# Patient Record
Sex: FEMALE | Race: WHITE
[De-identification: names, ages, dates, MRNs, and addresses within clinical notes are randomized per-mention and may not be internally consistent; named-entity substitution may affect disease eponyms.]

---

## 2024-10-22 SDOH — HEALTH STABILITY: PHYSICAL HEALTH
ON AVERAGE, HOW MANY DAYS PER WEEK DO YOU ENGAGE IN MODERATE TO STRENUOUS EXERCISE (LIKE A BRISK WALK)?: PATIENT DECLINED

## 2024-10-23 ENCOUNTER — OFFICE VISIT (OUTPATIENT)
Facility: CLINIC | Age: 49
End: 2024-10-23
Payer: COMMERCIAL

## 2024-10-23 VITALS
DIASTOLIC BLOOD PRESSURE: 65 MMHG | BODY MASS INDEX: 29.88 KG/M2 | TEMPERATURE: 97.9 F | WEIGHT: 190.4 LBS | RESPIRATION RATE: 16 BRPM | HEIGHT: 67 IN | OXYGEN SATURATION: 95 % | SYSTOLIC BLOOD PRESSURE: 107 MMHG | HEART RATE: 79 BPM

## 2024-10-23 DIAGNOSIS — Z12.31 ENCOUNTER FOR SCREENING MAMMOGRAM FOR BREAST CANCER: ICD-10-CM

## 2024-10-23 DIAGNOSIS — G25.81 RLS (RESTLESS LEGS SYNDROME): ICD-10-CM

## 2024-10-23 DIAGNOSIS — Z76.89 ENCOUNTER TO ESTABLISH CARE: ICD-10-CM

## 2024-10-23 DIAGNOSIS — F41.9 ANXIETY: ICD-10-CM

## 2024-10-23 DIAGNOSIS — E03.9 HYPOTHYROIDISM, UNSPECIFIED TYPE: ICD-10-CM

## 2024-10-23 DIAGNOSIS — Z12.11 SCREENING FOR COLON CANCER: ICD-10-CM

## 2024-10-23 DIAGNOSIS — I10 PRIMARY HYPERTENSION: Primary | ICD-10-CM

## 2024-10-23 DIAGNOSIS — J45.909 MILD ASTHMA WITHOUT COMPLICATION, UNSPECIFIED WHETHER PERSISTENT: ICD-10-CM

## 2024-10-23 DIAGNOSIS — L40.50 PA (PSORIATIC ARTHRITIS) (HCC): ICD-10-CM

## 2024-10-23 PROBLEM — Z86.711 HISTORY OF PULMONARY EMBOLUS (PE): Status: ACTIVE | Noted: 2024-10-23

## 2024-10-23 PROBLEM — M79.7 FIBROMYALGIA: Status: ACTIVE | Noted: 2022-12-01

## 2024-10-23 PROBLEM — F33.40 RECURRENT MAJOR DEPRESSIVE DISORDER, IN REMISSION (HCC): Status: ACTIVE | Noted: 2024-10-23

## 2024-10-23 PROBLEM — Z86.718 HISTORY OF DEEP VENOUS THROMBOSIS: Status: ACTIVE | Noted: 2024-10-23

## 2024-10-23 LAB
A/G RATIO: 1.4 RATIO (ref 1.1–2.6)
ALBUMIN: 4 G/DL (ref 3.5–5)
ALP BLD-CCNC: 40 U/L (ref 25–115)
ALT SERPL-CCNC: 9 U/L (ref 5–40)
ANION GAP SERPL CALCULATED.3IONS-SCNC: 7 MMOL/L (ref 3–15)
AST SERPL-CCNC: 14 U/L (ref 10–37)
BASOPHILS ABSOLUTE: 0.1 K/UL (ref 0–0.2)
BASOPHILS RELATIVE PERCENT: 1 % (ref 0–2)
BILIRUB SERPL-MCNC: 0.3 MG/DL (ref 0.2–1.2)
BUN BLDV-MCNC: 11 MG/DL (ref 6–22)
CALCIUM SERPL-MCNC: 9.7 MG/DL (ref 8.4–10.5)
CHLORIDE BLD-SCNC: 102 MMOL/L (ref 98–110)
CHOLESTEROL, TOTAL: 158 MG/DL (ref 110–200)
CHOLESTEROL/HDL RATIO: 2.4 (ref 0–5)
CO2: 31 MMOL/L (ref 20–32)
CREAT SERPL-MCNC: 0.8 MG/DL (ref 0.5–1.2)
EOSINOPHIL # BLD: 6 % (ref 0–6)
EOSINOPHILS ABSOLUTE: 0.5 K/UL (ref 0–0.5)
GFR, ESTIMATED: >60 ML/MIN/1.73 SQ.M.
GLOBULIN: 2.9 G/DL (ref 2–4)
GLUCOSE: 84 MG/DL (ref 70–99)
HCT VFR BLD CALC: 39.3 % (ref 35.1–48)
HDLC SERPL-MCNC: 67 MG/DL
HEMOGLOBIN: 12.8 G/DL (ref 11.7–16)
LDL CHOLESTEROL: 78 MG/DL (ref 50–99)
LDL/HDL RATIO: 1.2
LYMPHOCYTES # BLD: 38 % (ref 20–45)
LYMPHOCYTES ABSOLUTE: 3.4 K/UL (ref 1–4.8)
MCH RBC QN AUTO: 31 PG (ref 26–34)
MCHC RBC AUTO-ENTMCNC: 33 G/DL (ref 31–36)
MCV RBC AUTO: 95 FL (ref 80–99)
MONOCYTES ABSOLUTE: 1.3 K/UL (ref 0.1–1)
MONOCYTES: 15 % (ref 3–12)
NEUTROPHILS ABSOLUTE: 3.7 K/UL (ref 1.8–7.7)
NEUTROPHILS: 41 % (ref 40–75)
NON-HDL CHOLESTEROL: 91 MG/DL
PDW BLD-RTO: 12.3 % (ref 10–15.5)
PLATELET # BLD: 195 K/UL (ref 140–440)
PMV BLD AUTO: 13.3 FL (ref 9–13)
POTASSIUM SERPL-SCNC: 4.4 MMOL/L (ref 3.5–5.5)
RBC # BLD: 4.12 M/UL (ref 3.8–5.2)
SODIUM BLD-SCNC: 140 MMOL/L (ref 133–145)
TOTAL PROTEIN: 6.9 G/DL (ref 6.4–8.3)
TRIGL SERPL-MCNC: 62 MG/DL (ref 40–149)
TSH SERPL DL<=0.05 MIU/L-ACNC: 0.03 MCU/ML (ref 0.27–4.2)
VLDLC SERPL CALC-MCNC: 12 MG/DL (ref 8–30)
WBC # BLD: 9 K/UL (ref 4–11)

## 2024-10-23 PROCEDURE — 3078F DIAST BP <80 MM HG: CPT

## 2024-10-23 PROCEDURE — 99205 OFFICE O/P NEW HI 60 MIN: CPT

## 2024-10-23 PROCEDURE — 3074F SYST BP LT 130 MM HG: CPT

## 2024-10-23 RX ORDER — TRAMADOL HYDROCHLORIDE 50 MG/1
50 TABLET ORAL EVERY 8 HOURS PRN
COMMUNITY
Start: 2024-09-05

## 2024-10-23 RX ORDER — LEVOTHYROXINE SODIUM 137 UG/1
137 TABLET ORAL DAILY
COMMUNITY
Start: 2024-09-21 | End: 2024-10-23 | Stop reason: SDUPTHER

## 2024-10-23 RX ORDER — USTEKINUMAB 90 MG/ML
1 INJECTION, SOLUTION SUBCUTANEOUS
COMMUNITY
Start: 2023-10-07

## 2024-10-23 RX ORDER — MELOXICAM 15 MG/1
1 TABLET ORAL DAILY
COMMUNITY
Start: 2021-12-21

## 2024-10-23 RX ORDER — LISINOPRIL 5 MG/1
1 TABLET ORAL DAILY
COMMUNITY
Start: 2024-09-05

## 2024-10-23 RX ORDER — PREGABALIN 200 MG/1
1 CAPSULE ORAL EVERY 8 HOURS
COMMUNITY
Start: 2020-08-01

## 2024-10-23 RX ORDER — ALBUTEROL SULFATE 90 UG/1
2 INHALANT RESPIRATORY (INHALATION) 4 TIMES DAILY PRN
Qty: 18 G | Refills: 5 | Status: SHIPPED | OUTPATIENT
Start: 2024-10-23

## 2024-10-23 RX ORDER — LEVOTHYROXINE SODIUM 137 UG/1
137 TABLET ORAL DAILY
Qty: 30 TABLET | Refills: 0 | Status: SHIPPED | OUTPATIENT
Start: 2024-10-23

## 2024-10-23 RX ORDER — MONTELUKAST SODIUM 10 MG/1
10 TABLET ORAL NIGHTLY
COMMUNITY
Start: 2004-09-01

## 2024-10-23 SDOH — ECONOMIC STABILITY: FOOD INSECURITY: WITHIN THE PAST 12 MONTHS, THE FOOD YOU BOUGHT JUST DIDN'T LAST AND YOU DIDN'T HAVE MONEY TO GET MORE.: NEVER TRUE

## 2024-10-23 SDOH — ECONOMIC STABILITY: FOOD INSECURITY: WITHIN THE PAST 12 MONTHS, YOU WORRIED THAT YOUR FOOD WOULD RUN OUT BEFORE YOU GOT MONEY TO BUY MORE.: NEVER TRUE

## 2024-10-23 SDOH — ECONOMIC STABILITY: INCOME INSECURITY: HOW HARD IS IT FOR YOU TO PAY FOR THE VERY BASICS LIKE FOOD, HOUSING, MEDICAL CARE, AND HEATING?: NOT HARD AT ALL

## 2024-10-23 ASSESSMENT — PATIENT HEALTH QUESTIONNAIRE - PHQ9
SUM OF ALL RESPONSES TO PHQ QUESTIONS 1-9: 0
SUM OF ALL RESPONSES TO PHQ QUESTIONS 1-9: 0
SUM OF ALL RESPONSES TO PHQ9 QUESTIONS 1 & 2: 0
2. FEELING DOWN, DEPRESSED OR HOPELESS: NOT AT ALL
SUM OF ALL RESPONSES TO PHQ QUESTIONS 1-9: 0
1. LITTLE INTEREST OR PLEASURE IN DOING THINGS: NOT AT ALL
SUM OF ALL RESPONSES TO PHQ QUESTIONS 1-9: 0

## 2024-10-23 ASSESSMENT — ANXIETY QUESTIONNAIRES
GAD7 TOTAL SCORE: 12
7. FEELING AFRAID AS IF SOMETHING AWFUL MIGHT HAPPEN: NOT AT ALL
5. BEING SO RESTLESS THAT IT IS HARD TO SIT STILL: MORE THAN HALF THE DAYS
4. TROUBLE RELAXING: NEARLY EVERY DAY
2. NOT BEING ABLE TO STOP OR CONTROL WORRYING: MORE THAN HALF THE DAYS
1. FEELING NERVOUS, ANXIOUS, OR ON EDGE: MORE THAN HALF THE DAYS
6. BECOMING EASILY ANNOYED OR IRRITABLE: SEVERAL DAYS
3. WORRYING TOO MUCH ABOUT DIFFERENT THINGS: MORE THAN HALF THE DAYS
IF YOU CHECKED OFF ANY PROBLEMS ON THIS QUESTIONNAIRE, HOW DIFFICULT HAVE THESE PROBLEMS MADE IT FOR YOU TO DO YOUR WORK, TAKE CARE OF THINGS AT HOME, OR GET ALONG WITH OTHER PEOPLE: VERY DIFFICULT

## 2024-10-23 ASSESSMENT — ENCOUNTER SYMPTOMS: SHORTNESS OF BREATH: 0

## 2024-10-23 NOTE — PROGRESS NOTES
Nicole Ennis presents today for   Chief Complaint   Patient presents with    New Patient     Current/Previous PCP/Specialists: [place]  Previous PCP: [place]  Reason for switching: Moved  Any speciality providers: Rhm, Neuro, Ortho    Is someone accompanying this pt? No    Is the patient using any DME equipment during OV? No    Depression Screening:      10/23/2024     8:18 AM   PHQ-9 Questionaire   Little interest or pleasure in doing things 0   Feeling down, depressed, or hopeless 0   PHQ-9 Total Score 0        MARCO ANTONIO 7-Anxiety       10/23/2024     8:18 AM   MARCO ANTONIO-7 SCREENING   Feeling nervous, anxious, or on edge More than half the days   Not being able to stop or control worrying More than half the days   Worrying too much about different things More than half the days   Trouble relaxing Nearly every day   Being so restless that it is hard to sit still More than half the days   Becoming easily annoyed or irritable Several days   Feeling afraid as if something awful might happen Not at all   MARCO ANTONIO-7 Total Score 12   How difficult have these problems made it for you to do your work, take care of things at home, or get along with other people? Very difficult        Travel Screening:    Travel Screening       Question Response    Have you been in contact with someone who was sick? No / Unsure    Do you have any of the following new or worsening symptoms? None of these    Have you traveled internationally or domestically in the last month? No          Travel History   Travel since 09/23/24    No documented travel since 09/23/24          Health Maintenance reviewed and discussed and ordered per Provider.  Transportation Needs: Unknown (10/23/2024)    PRAPARE - Transportation     Lack of Transportation (Medical): Not on file     Lack of Transportation (Non-Medical): No      Food Insecurity: No Food Insecurity (10/23/2024)    Hunger Vital Sign     Worried About Running Out of Food in the Last Year: Never true     Ran Out 
Roommate   Alcohol use: None   Tobacco use: Vapes nicotine   Other recreational drug use: marijuana     Gyn Hx:   2 Para 2  Menopause: Venlafaxine 50 mg helps with hot flashes   If menopausal, any abnormal vaginal bleeding?    Hysterectomy: Partial   Would like to have hormones checked   Last mammogram (if applicable): Due; reports history fibrocystic tissue on prior mammograms (left breast)     Review of Systems   Respiratory:  Negative for shortness of breath.    Cardiovascular:  Negative for chest pain, palpitations and leg swelling.   Neurological:  Negative for dizziness, light-headedness and headaches.      Objective:   /65 (Site: Left Upper Arm, Position: Sitting, Cuff Size: Large Adult)   Pulse 79   Temp 97.9 °F (36.6 °C) (Temporal)   Resp 16   Ht 1.702 m (5' 7\")   Wt 86.4 kg (190 lb 6.4 oz)   SpO2 95%   BMI 29.82 kg/m²     Physical Exam  Vitals and nursing note reviewed.   Constitutional:       General: She is not in acute distress.     Appearance: She is not ill-appearing.   HENT:      Head: Normocephalic and atraumatic.   Cardiovascular:      Rate and Rhythm: Normal rate and regular rhythm.   Pulmonary:      Effort: Pulmonary effort is normal. No respiratory distress.      Breath sounds: Wheezing present. No rhonchi or rales.   Musculoskeletal:         General: Normal range of motion.   Skin:     General: Skin is warm and dry.   Neurological:      General: No focal deficit present.      Mental Status: She is alert.   Psychiatric:         Mood and Affect: Mood normal.         Thought Content: Thought content normal.         Judgment: Judgment normal.     Total time spent: 60 min   Bakari Alejandra, APRN - CNP

## 2024-10-23 NOTE — ASSESSMENT & PLAN NOTE
Controlled  Continue current medications  Labs ordered today  Awaiting results  Maintain low-salt diet  Daily exercise as tolerated

## 2024-11-06 ENCOUNTER — OFFICE VISIT (OUTPATIENT)
Facility: CLINIC | Age: 49
End: 2024-11-06
Payer: COMMERCIAL

## 2024-11-06 VITALS
WEIGHT: 188 LBS | BODY MASS INDEX: 29.51 KG/M2 | OXYGEN SATURATION: 96 % | RESPIRATION RATE: 18 BRPM | DIASTOLIC BLOOD PRESSURE: 68 MMHG | HEIGHT: 67 IN | TEMPERATURE: 98 F | SYSTOLIC BLOOD PRESSURE: 102 MMHG | HEART RATE: 81 BPM

## 2024-11-06 DIAGNOSIS — J45.909 MILD ASTHMA WITHOUT COMPLICATION, UNSPECIFIED WHETHER PERSISTENT: ICD-10-CM

## 2024-11-06 DIAGNOSIS — Z71.2 ENCOUNTER TO DISCUSS TEST RESULTS: ICD-10-CM

## 2024-11-06 DIAGNOSIS — G25.81 RLS (RESTLESS LEGS SYNDROME): ICD-10-CM

## 2024-11-06 DIAGNOSIS — E03.9 HYPOTHYROIDISM, UNSPECIFIED TYPE: Primary | ICD-10-CM

## 2024-11-06 DIAGNOSIS — I10 PRIMARY HYPERTENSION: ICD-10-CM

## 2024-11-06 PROCEDURE — 3078F DIAST BP <80 MM HG: CPT

## 2024-11-06 PROCEDURE — 99214 OFFICE O/P EST MOD 30 MIN: CPT

## 2024-11-06 PROCEDURE — 3074F SYST BP LT 130 MM HG: CPT

## 2024-11-06 RX ORDER — LISINOPRIL 5 MG/1
5 TABLET ORAL DAILY
Qty: 30 TABLET | Refills: 2 | Status: SHIPPED | OUTPATIENT
Start: 2024-11-06

## 2024-11-06 RX ORDER — MONTELUKAST SODIUM 10 MG/1
10 TABLET ORAL NIGHTLY
Qty: 30 TABLET | Refills: 2 | Status: SHIPPED | OUTPATIENT
Start: 2024-11-06

## 2024-11-06 RX ORDER — PREGABALIN 200 MG/1
200 CAPSULE ORAL 2 TIMES DAILY
Qty: 60 CAPSULE | Refills: 0 | Status: SHIPPED | OUTPATIENT
Start: 2024-11-06 | End: 2024-11-07

## 2024-11-06 SDOH — ECONOMIC STABILITY: FOOD INSECURITY: WITHIN THE PAST 12 MONTHS, YOU WORRIED THAT YOUR FOOD WOULD RUN OUT BEFORE YOU GOT MONEY TO BUY MORE.: NEVER TRUE

## 2024-11-06 SDOH — ECONOMIC STABILITY: FOOD INSECURITY: WITHIN THE PAST 12 MONTHS, THE FOOD YOU BOUGHT JUST DIDN'T LAST AND YOU DIDN'T HAVE MONEY TO GET MORE.: NEVER TRUE

## 2024-11-06 SDOH — ECONOMIC STABILITY: INCOME INSECURITY: HOW HARD IS IT FOR YOU TO PAY FOR THE VERY BASICS LIKE FOOD, HOUSING, MEDICAL CARE, AND HEATING?: NOT HARD AT ALL

## 2024-11-06 ASSESSMENT — PATIENT HEALTH QUESTIONNAIRE - PHQ9
10. IF YOU CHECKED OFF ANY PROBLEMS, HOW DIFFICULT HAVE THESE PROBLEMS MADE IT FOR YOU TO DO YOUR WORK, TAKE CARE OF THINGS AT HOME, OR GET ALONG WITH OTHER PEOPLE: NOT DIFFICULT AT ALL
8. MOVING OR SPEAKING SO SLOWLY THAT OTHER PEOPLE COULD HAVE NOTICED. OR THE OPPOSITE, BEING SO FIGETY OR RESTLESS THAT YOU HAVE BEEN MOVING AROUND A LOT MORE THAN USUAL: NOT AT ALL
SUM OF ALL RESPONSES TO PHQ QUESTIONS 1-9: 0
5. POOR APPETITE OR OVEREATING: NOT AT ALL
SUM OF ALL RESPONSES TO PHQ QUESTIONS 1-9: 0
2. FEELING DOWN, DEPRESSED OR HOPELESS: NOT AT ALL
SUM OF ALL RESPONSES TO PHQ QUESTIONS 1-9: 0
SUM OF ALL RESPONSES TO PHQ9 QUESTIONS 1 & 2: 0
9. THOUGHTS THAT YOU WOULD BE BETTER OFF DEAD, OR OF HURTING YOURSELF: NOT AT ALL
7. TROUBLE CONCENTRATING ON THINGS, SUCH AS READING THE NEWSPAPER OR WATCHING TELEVISION: NOT AT ALL
4. FEELING TIRED OR HAVING LITTLE ENERGY: NOT AT ALL
SUM OF ALL RESPONSES TO PHQ QUESTIONS 1-9: 0
3. TROUBLE FALLING OR STAYING ASLEEP: NOT AT ALL
1. LITTLE INTEREST OR PLEASURE IN DOING THINGS: NOT AT ALL
6. FEELING BAD ABOUT YOURSELF - OR THAT YOU ARE A FAILURE OR HAVE LET YOURSELF OR YOUR FAMILY DOWN: NOT AT ALL

## 2024-11-06 ASSESSMENT — ANXIETY QUESTIONNAIRES
6. BECOMING EASILY ANNOYED OR IRRITABLE: SEVERAL DAYS
IF YOU CHECKED OFF ANY PROBLEMS ON THIS QUESTIONNAIRE, HOW DIFFICULT HAVE THESE PROBLEMS MADE IT FOR YOU TO DO YOUR WORK, TAKE CARE OF THINGS AT HOME, OR GET ALONG WITH OTHER PEOPLE: SOMEWHAT DIFFICULT
4. TROUBLE RELAXING: MORE THAN HALF THE DAYS
7. FEELING AFRAID AS IF SOMETHING AWFUL MIGHT HAPPEN: NOT AT ALL
3. WORRYING TOO MUCH ABOUT DIFFERENT THINGS: SEVERAL DAYS
2. NOT BEING ABLE TO STOP OR CONTROL WORRYING: MORE THAN HALF THE DAYS
5. BEING SO RESTLESS THAT IT IS HARD TO SIT STILL: SEVERAL DAYS
GAD7 TOTAL SCORE: 9
1. FEELING NERVOUS, ANXIOUS, OR ON EDGE: MORE THAN HALF THE DAYS

## 2024-11-06 NOTE — PROGRESS NOTES
Chief Complaint   Patient presents with    Follow-up    Discuss Labs     Assessment & Plan:     1. Hypothyroidism, unspecified type  Assessment & Plan:  Repeat labs ordered today   Continue levothyroxine 112 mcg daily  Awaiting results  Orders:  -     Basic Metabolic Panel; Future  -     CBC with Auto Differential; Future  -     Thyroid Cascade Profile; Future  2. RLS (restless legs syndrome)  Assessment & Plan:  Stable on medication  Continue Lyrica 200 mg BID   Medication refilled per patient request   CSA reviewed and signed today   Advised to establish with neurology   Referral information given today   Orders:  -     LYRICA 200 MG capsule; Take 1 capsule by mouth 2 times daily for 30 days. Max Daily Amount: 400 mg, Disp-60 capsule, R-0, DAWNormal  3. Mild asthma without complication, unspecified whether persistent  Assessment & Plan:  Stable on medication   Continue montelukast 10 mg daily   Medication refilled per patient request  Orders:  -     montelukast (SINGULAIR) 10 MG tablet; Take 1 tablet by mouth nightly, Disp-30 tablet, R-2Normal  4. Primary hypertension  Assessment & Plan:  Controlled on medication   Continue lisinopril 5 mg daily  Medication refilled per patient request  Orders:  -     lisinopril (PRINIVIL;ZESTRIL) 5 MG tablet; Take 1 tablet by mouth daily, Disp-30 tablet, R-2Normal  5. Encounter to discuss test results  Comments:  Labs reviewed in depth with patient  Verbalized understanding and agreeable to plan   No further questions/concerns at this time    Follow-up and Dispositions    Return in about 8 weeks (around 1/1/2025) for chronic disease management (tsh).       Subjective:     HPI  Chronic Disease Management  Hypothyroidism:   Denies any weight changes, palpitations, constipation/diarrhea, skin/hair changes  Taking medication on empty stomach  Current treatment: 112 mcg   Was recently decreased to this dose due to low TSH   States that she does have a history of thyroid nodule which 
other people? Somewhat difficult Very difficult        Travel Screening:    Travel Screening     No screening recorded since 11/05/24 0000       Travel History   Travel since 10/06/24    No documented travel since 10/06/24          Health Maintenance reviewed and discussed and ordered per Provider.  Transportation Needs: Unknown (11/6/2024)    PRAPARE - Transportation     Lack of Transportation (Medical): Not on file     Lack of Transportation (Non-Medical): No      Food Insecurity: No Food Insecurity (11/6/2024)    Hunger Vital Sign     Worried About Running Out of Food in the Last Year: Never true     Ran Out of Food in the Last Year: Never true     Financial Resource Strain: Low Risk  (11/6/2024)    Overall Financial Resource Strain (CARDIA)     Difficulty of Paying Living Expenses: Not hard at all     Housing Stability: Unknown (11/6/2024)    Housing Stability Vital Sign     Unable to Pay for Housing in the Last Year: Not on file     Number of Times Moved in the Last Year: Not on file     Homeless in the Last Year: No       Did you provide resources if patient requested them? None Requested      Health Maintenance Due   Topic Date Due    Pneumococcal 0-64 years Vaccine (1 of 2 - PCV) Never done    HIV screen  Never done    Hepatitis C screen  Never done    Hepatitis B vaccine (1 of 3 - 19+ 3-dose series) Never done    DTaP/Tdap/Td vaccine (1 - Tdap) Never done    Breast cancer screen  Never done    Colorectal Cancer Screen  Never done    Shingles vaccine (1 of 2) Never done    Flu vaccine (1) Never done    COVID-19 Vaccine (1 - 2023-24 season) Never done   .      \"Have you been to the ER, urgent care clinic since your last visit?  Hospitalized since your last visit?\"    NO    “Have you seen or consulted any other health care providers outside of [facility] since your last visit?”    NO    “Have you had a colorectal cancer screening such as a colonoscopy/FIT/Cologuard?    NO    No colonoscopy on

## 2024-11-07 ENCOUNTER — TELEPHONE (OUTPATIENT)
Facility: CLINIC | Age: 49
End: 2024-11-07

## 2024-11-07 RX ORDER — PREGABALIN 100 MG/1
100 CAPSULE ORAL 2 TIMES DAILY
Qty: 60 CAPSULE | Refills: 0 | Status: SHIPPED | OUTPATIENT
Start: 2024-11-07 | End: 2024-12-07

## 2024-11-07 ASSESSMENT — ENCOUNTER SYMPTOMS: SHORTNESS OF BREATH: 0

## 2024-11-07 NOTE — ASSESSMENT & PLAN NOTE
Controlled on medication   Continue lisinopril 5 mg daily  Medication refilled per patient request

## 2024-11-07 NOTE — TELEPHONE ENCOUNTER
Pt called and stated her insurance denied her medication LYRICA 200 MG capsule. Pt stated she really needs her medication or she is going to be in the hospital in 24 hours. Please advise. Thank you!!!

## 2024-11-07 NOTE — ASSESSMENT & PLAN NOTE
Stable on medication  Continue Lyrica 200 mg BID   Medication refilled per patient request   CSA reviewed and signed today   Advised to establish with neurology   Referral information given today    Doxepin Counseling:  Patient advised that the medication is sedating and not to drive a car after taking this medication. Patient informed of potential adverse effects including but not limited to dry mouth, urinary retention, and blurry vision.  The patient verbalized understanding of the proper use and possible adverse effects of doxepin.  All of the patient's questions and concerns were addressed.

## 2024-11-07 NOTE — TELEPHONE ENCOUNTER
Called patient to let her know Bakari has prescribed her the generic brand Preagablin as instructed by her insurance. Patient understood and says that is completley fine.

## 2024-11-22 DIAGNOSIS — G25.81 RLS (RESTLESS LEGS SYNDROME): ICD-10-CM

## 2024-11-22 RX ORDER — PREGABALIN 100 MG/1
100 CAPSULE ORAL 2 TIMES DAILY
Qty: 60 CAPSULE | Refills: 0 | Status: SHIPPED | OUTPATIENT
Start: 2024-11-22 | End: 2024-12-22

## 2024-11-25 RX ORDER — PREGABALIN 100 MG/1
100 CAPSULE ORAL 2 TIMES DAILY
Qty: 60 CAPSULE | Refills: 0 | OUTPATIENT
Start: 2024-11-25 | End: 2024-12-25

## 2024-11-27 DIAGNOSIS — G25.81 RLS (RESTLESS LEGS SYNDROME): Primary | ICD-10-CM

## 2024-11-27 RX ORDER — PREGABALIN 200 MG/1
200 CAPSULE ORAL 2 TIMES DAILY
Qty: 30 CAPSULE | Refills: 0 | Status: SHIPPED | OUTPATIENT
Start: 2024-11-27 | End: 2024-12-27

## 2024-12-02 ENCOUNTER — OFFICE VISIT (OUTPATIENT)
Age: 49
End: 2024-12-02
Payer: COMMERCIAL

## 2024-12-02 VITALS
WEIGHT: 177.8 LBS | HEIGHT: 67 IN | HEART RATE: 96 BPM | RESPIRATION RATE: 12 BRPM | OXYGEN SATURATION: 97 % | TEMPERATURE: 98.5 F | DIASTOLIC BLOOD PRESSURE: 67 MMHG | BODY MASS INDEX: 27.91 KG/M2 | SYSTOLIC BLOOD PRESSURE: 95 MMHG

## 2024-12-02 DIAGNOSIS — G25.81 RLS (RESTLESS LEGS SYNDROME): Primary | ICD-10-CM

## 2024-12-02 PROCEDURE — 99204 OFFICE O/P NEW MOD 45 MIN: CPT | Performed by: PSYCHIATRY & NEUROLOGY

## 2024-12-02 PROCEDURE — 3074F SYST BP LT 130 MM HG: CPT | Performed by: PSYCHIATRY & NEUROLOGY

## 2024-12-02 PROCEDURE — G2211 COMPLEX E/M VISIT ADD ON: HCPCS | Performed by: PSYCHIATRY & NEUROLOGY

## 2024-12-02 PROCEDURE — 3078F DIAST BP <80 MM HG: CPT | Performed by: PSYCHIATRY & NEUROLOGY

## 2024-12-02 RX ORDER — PRAMIPEXOLE DIHYDROCHLORIDE 0.5 MG/1
0.5 TABLET ORAL NIGHTLY
Qty: 90 TABLET | Refills: 3 | Status: SHIPPED | OUTPATIENT
Start: 2024-12-02

## 2024-12-02 RX ORDER — PREGABALIN 200 MG/1
200 CAPSULE ORAL 2 TIMES DAILY
Qty: 60 CAPSULE | Refills: 5 | Status: SHIPPED | OUTPATIENT
Start: 2024-12-02 | End: 2025-05-31

## 2024-12-02 NOTE — PROGRESS NOTES
CO2,  Chloride, Creatinine, Glucose, Potassium, Sodium, Total Bilirubin and Total  Protein.        Cholesterol, Total 10/23/2024 158  110 - 200 mg/dL Final    Triglycerides 10/23/2024 62  40 - 149 mg/dL Final    HDL 10/23/2024 67  >=40 mg/dL Final    Chol/HDL Ratio 10/23/2024 2.4  0.0 - 5.0 Final    Non-HDL Cholesterol 10/23/2024 91  <130 mg/dL Final    LDL Cholesterol 10/23/2024 78  50 - 99 mg/dL Final    VLDL Cholesterol Calculated 10/23/2024 12  8 - 30 mg/dL Final    LDL/HDL Ratio 10/23/2024 1.2   Final    Comment: Test includes cholesterol, HDL cholesterol, triglycerides and LDL.    Cholesterol Recommended NCEP guidelines in mg/dL:    Less than 200            Desirable  200 - 239                Borderline High  Greater than or  = 240   High      Please Note:  Total Chol/HDL Ratio                     Men     Women  1/2 Avg. Risk    3.4     3.3      Avg. Risk    5.0     4.4  2X  Avg. Risk    9.6     7.1  3X  Avg. Risk   23.4    11.0                Medical problems and test results were reviewed with the patient today.         ASSESSMENT and PLAN    1. RLS (restless legs syndrome)  Treatment refractory restless leg symptoms.  Gabapentin, ropinirole has been effective in the past.  Will start Mirapex.  Counseled on side effect profile of sedation, lower extremity swelling and compulsive behaviors.  Iron studies should be checked and if any iron deficiency present, it should be treated. She states this has been checked by her previous neurologist.  Continue pregabalin.   No follow-up provider specified.  Lencho Pacheco MD

## 2024-12-19 ENCOUNTER — HOSPITAL ENCOUNTER (OUTPATIENT)
Facility: HOSPITAL | Age: 49
Discharge: HOME | End: 2024-12-22
Payer: COMMERCIAL

## 2024-12-19 VITALS — BODY MASS INDEX: 27.78 KG/M2 | WEIGHT: 177 LBS | HEIGHT: 67 IN

## 2024-12-19 DIAGNOSIS — Z12.31 ENCOUNTER FOR SCREENING MAMMOGRAM FOR BREAST CANCER: ICD-10-CM

## 2024-12-19 PROCEDURE — 77063 BREAST TOMOSYNTHESIS BI: CPT

## 2025-01-07 ENCOUNTER — OFFICE VISIT (OUTPATIENT)
Facility: CLINIC | Age: 50
End: 2025-01-07
Payer: COMMERCIAL

## 2025-01-07 VITALS
HEART RATE: 82 BPM | WEIGHT: 182 LBS | DIASTOLIC BLOOD PRESSURE: 79 MMHG | RESPIRATION RATE: 18 BRPM | TEMPERATURE: 97.8 F | OXYGEN SATURATION: 98 % | BODY MASS INDEX: 28.56 KG/M2 | SYSTOLIC BLOOD PRESSURE: 129 MMHG | HEIGHT: 67 IN

## 2025-01-07 DIAGNOSIS — G25.81 RLS (RESTLESS LEGS SYNDROME): ICD-10-CM

## 2025-01-07 DIAGNOSIS — N39.3 STRESS INCONTINENCE: Primary | ICD-10-CM

## 2025-01-07 PROCEDURE — 3078F DIAST BP <80 MM HG: CPT

## 2025-01-07 PROCEDURE — 3074F SYST BP LT 130 MM HG: CPT

## 2025-01-07 PROCEDURE — 99214 OFFICE O/P EST MOD 30 MIN: CPT

## 2025-01-07 SDOH — ECONOMIC STABILITY: FOOD INSECURITY: WITHIN THE PAST 12 MONTHS, YOU WORRIED THAT YOUR FOOD WOULD RUN OUT BEFORE YOU GOT MONEY TO BUY MORE.: NEVER TRUE

## 2025-01-07 SDOH — ECONOMIC STABILITY: INCOME INSECURITY: HOW HARD IS IT FOR YOU TO PAY FOR THE VERY BASICS LIKE FOOD, HOUSING, MEDICAL CARE, AND HEATING?: NOT HARD AT ALL

## 2025-01-07 SDOH — ECONOMIC STABILITY: FOOD INSECURITY: WITHIN THE PAST 12 MONTHS, THE FOOD YOU BOUGHT JUST DIDN'T LAST AND YOU DIDN'T HAVE MONEY TO GET MORE.: NEVER TRUE

## 2025-01-07 ASSESSMENT — PATIENT HEALTH QUESTIONNAIRE - PHQ9
10. IF YOU CHECKED OFF ANY PROBLEMS, HOW DIFFICULT HAVE THESE PROBLEMS MADE IT FOR YOU TO DO YOUR WORK, TAKE CARE OF THINGS AT HOME, OR GET ALONG WITH OTHER PEOPLE: NOT DIFFICULT AT ALL
6. FEELING BAD ABOUT YOURSELF - OR THAT YOU ARE A FAILURE OR HAVE LET YOURSELF OR YOUR FAMILY DOWN: NOT AT ALL
9. THOUGHTS THAT YOU WOULD BE BETTER OFF DEAD, OR OF HURTING YOURSELF: NOT AT ALL
SUM OF ALL RESPONSES TO PHQ QUESTIONS 1-9: 0
SUM OF ALL RESPONSES TO PHQ QUESTIONS 1-9: 0
2. FEELING DOWN, DEPRESSED OR HOPELESS: NOT AT ALL
1. LITTLE INTEREST OR PLEASURE IN DOING THINGS: NOT AT ALL
5. POOR APPETITE OR OVEREATING: NOT AT ALL
SUM OF ALL RESPONSES TO PHQ9 QUESTIONS 1 & 2: 0
8. MOVING OR SPEAKING SO SLOWLY THAT OTHER PEOPLE COULD HAVE NOTICED. OR THE OPPOSITE, BEING SO FIGETY OR RESTLESS THAT YOU HAVE BEEN MOVING AROUND A LOT MORE THAN USUAL: NOT AT ALL
SUM OF ALL RESPONSES TO PHQ QUESTIONS 1-9: 0
4. FEELING TIRED OR HAVING LITTLE ENERGY: NOT AT ALL
SUM OF ALL RESPONSES TO PHQ QUESTIONS 1-9: 0
3. TROUBLE FALLING OR STAYING ASLEEP: NOT AT ALL
7. TROUBLE CONCENTRATING ON THINGS, SUCH AS READING THE NEWSPAPER OR WATCHING TELEVISION: NOT AT ALL

## 2025-01-07 ASSESSMENT — ANXIETY QUESTIONNAIRES
1. FEELING NERVOUS, ANXIOUS, OR ON EDGE: NOT AT ALL
4. TROUBLE RELAXING: NOT AT ALL
2. NOT BEING ABLE TO STOP OR CONTROL WORRYING: NOT AT ALL
7. FEELING AFRAID AS IF SOMETHING AWFUL MIGHT HAPPEN: NOT AT ALL
3. WORRYING TOO MUCH ABOUT DIFFERENT THINGS: NOT AT ALL
IF YOU CHECKED OFF ANY PROBLEMS ON THIS QUESTIONNAIRE, HOW DIFFICULT HAVE THESE PROBLEMS MADE IT FOR YOU TO DO YOUR WORK, TAKE CARE OF THINGS AT HOME, OR GET ALONG WITH OTHER PEOPLE: NOT DIFFICULT AT ALL
6. BECOMING EASILY ANNOYED OR IRRITABLE: NOT AT ALL
5. BEING SO RESTLESS THAT IT IS HARD TO SIT STILL: NOT AT ALL
GAD7 TOTAL SCORE: 0

## 2025-01-07 ASSESSMENT — SLEEP AND FATIGUE QUESTIONNAIRES
HOW LIKELY ARE YOU TO NOD OFF OR FALL ASLEEP WHEN YOU ARE A PASSENGER IN A CAR FOR AN HOUR WITHOUT A BREAK: MODERATE CHANCE OF DOZING
HOW LIKELY ARE YOU TO NOD OFF OR FALL ASLEEP WHILE SITTING INACTIVE IN A PUBLIC PLACE: WOULD NEVER DOZE
HOW LIKELY ARE YOU TO NOD OFF OR FALL ASLEEP WHEN YOU ARE A PASSENGER IN A CAR FOR AN HOUR WITHOUT A BREAK: MODERATE CHANCE OF DOZING
ESS TOTAL SCORE: 7
HOW LIKELY ARE YOU TO NOD OFF OR FALL ASLEEP WHILE SITTING AND TALKING TO SOMEONE: WOULD NEVER DOZE
HOW LIKELY ARE YOU TO NOD OFF OR FALL ASLEEP WHILE SITTING QUIETLY AFTER LUNCH WITHOUT ALCOHOL: SLIGHT CHANCE OF DOZING
HOW LIKELY ARE YOU TO NOD OFF OR FALL ASLEEP WHILE WATCHING TV: SLIGHT CHANCE OF DOZING
HOW LIKELY ARE YOU TO NOD OFF OR FALL ASLEEP WHILE SITTING INACTIVE IN A PUBLIC PLACE: WOULD NEVER DOZE
HOW LIKELY ARE YOU TO NOD OFF OR FALL ASLEEP WHILE SITTING QUIETLY AFTER LUNCH WITHOUT ALCOHOL: SLIGHT CHANCE OF DOZING
HOW LIKELY ARE YOU TO NOD OFF OR FALL ASLEEP WHILE LYING DOWN TO REST IN THE AFTERNOON WHEN CIRCUMSTANCES PERMIT: MODERATE CHANCE OF DOZING
HOW LIKELY ARE YOU TO NOD OFF OR FALL ASLEEP WHILE SITTING AND READING: SLIGHT CHANCE OF DOZING
HOW LIKELY ARE YOU TO NOD OFF OR FALL ASLEEP WHILE SITTING AND TALKING TO SOMEONE: WOULD NEVER DOZE
HOW LIKELY ARE YOU TO NOD OFF OR FALL ASLEEP WHILE WATCHING TV: SLIGHT CHANCE OF DOZING
HOW LIKELY ARE YOU TO NOD OFF OR FALL ASLEEP WHILE SITTING AND READING: SLIGHT CHANCE OF DOZING
HOW LIKELY ARE YOU TO NOD OFF OR FALL ASLEEP IN A CAR, WHILE STOPPED FOR A FEW MINUTES IN TRAFFIC: WOULD NEVER DOZE
HOW LIKELY ARE YOU TO NOD OFF OR FALL ASLEEP IN A CAR, WHILE STOPPED FOR A FEW MINUTES IN TRAFFIC: WOULD NEVER DOZE
HOW LIKELY ARE YOU TO NOD OFF OR FALL ASLEEP WHILE LYING DOWN TO REST IN THE AFTERNOON WHEN CIRCUMSTANCES PERMIT: MODERATE CHANCE OF DOZING

## 2025-01-07 NOTE — PROGRESS NOTES
Chief Complaint   Patient presents with    Follow-up     Assessment & Plan:     1. Stress incontinence  Assessment & Plan:  Chronic; worsening   Refer to urology for further evaluation  Would benefit from pelvic floor PT   Orders:  -     External Referral To Physical Therapy  -     [facility]  2. RLS (restless legs syndrome)  Assessment & Plan:  Stable on medication   Advised to continue following with neurology    Follow-up and Dispositions    Return in about 8 weeks (around 3/4/2025).       Subjective:     HPI  Chronic Disease Management  RLS:   Pt here today for follow-up and states that she was able to get in to see Neurology  Was started on Mirapex and states that symptoms are stable/improving   Doing well on this medication and is now getting her prescription of Lyrica through Neurology     Incontinence:   Today patient would also like to discuss ongoing issues with stress incontinence  States that she \"leaks\" urine whenever she coughs, sneezes, laughs   States that issues started after pregnancy and has gotten worse since abstaining from sexual activity  Denies urgency, frequency, pain with urination    Health Maintenance  Colonoscopy: 01/21/24   Mammogram: completed; awaiting results    Review of Systems   Respiratory:  Negative for shortness of breath.    Cardiovascular:  Negative for chest pain and leg swelling.   Neurological:  Negative for dizziness, light-headedness and headaches.     Objective:   /79 (Site: Left Upper Arm, Position: Sitting, Cuff Size: Medium Adult)   Pulse 82   Temp 97.8 °F (36.6 °C) (Temporal)   Resp 18   Ht 1.702 m (5' 7\")   Wt 82.6 kg (182 lb)   SpO2 98%   BMI 28.51 kg/m²     Physical Exam  Vitals and nursing note reviewed.   Constitutional:       General: She is not in acute distress.     Appearance: She is not ill-appearing.   HENT:      Head: Normocephalic and atraumatic.   Cardiovascular:      Rate and Rhythm: Normal rate and regular rhythm.

## 2025-01-07 NOTE — PROGRESS NOTES
Nicole Ennis presents today for   Chief Complaint   Patient presents with    Follow-up     Is someone accompanying this pt? No    Is the patient using any DME equipment during OV? No    Depression Screenin/7/2025     9:01 AM 2024     8:50 AM 10/23/2024     8:18 AM   PHQ-9 Questionaire   Little interest or pleasure in doing things 0 0 0   Feeling down, depressed, or hopeless 0 0 0   Trouble falling or staying asleep, or sleeping too much 0 0    Feeling tired or having little energy 0 0    Poor appetite or overeating 0 0    Feeling bad about yourself - or that you are a failure or have let yourself or your family down 0 0    Trouble concentrating on things, such as reading the newspaper or watching television 0 0    Moving or speaking so slowly that other people could have noticed. Or the opposite - being so fidgety or restless that you have been moving around a lot more than usual 0 0    Thoughts that you would be better off dead, or of hurting yourself in some way 0 0    PHQ-9 Total Score 0 0 0   If you checked off any problems, how difficult have these problems made it for you to do your work, take care of things at home, or get along with other people? 0 0         MARCO ANTONIO 7-Anxiety       2025     9:01 AM 2024     8:50 AM 10/23/2024     8:18 AM   MARCO ANTONIO-7 SCREENING   Feeling nervous, anxious, or on edge Not at all More than half the days More than half the days   Not being able to stop or control worrying Not at all More than half the days More than half the days   Worrying too much about different things Not at all Several days More than half the days   Trouble relaxing Not at all More than half the days Nearly every day   Being so restless that it is hard to sit still Not at all Several days More than half the days   Becoming easily annoyed or irritable Not at all Several days Several days   Feeling afraid as if something awful might happen Not at all Not at all Not at all   MARCO ANTONIO-7 Total Score

## 2025-01-08 PROBLEM — N39.3 STRESS INCONTINENCE: Status: ACTIVE | Noted: 2025-01-08

## 2025-01-08 ASSESSMENT — ENCOUNTER SYMPTOMS: SHORTNESS OF BREATH: 0

## 2025-01-10 ENCOUNTER — OFFICE VISIT (OUTPATIENT)
Age: 50
End: 2025-01-10
Payer: COMMERCIAL

## 2025-01-10 VITALS
DIASTOLIC BLOOD PRESSURE: 75 MMHG | HEART RATE: 83 BPM | WEIGHT: 185.2 LBS | BODY MASS INDEX: 29.01 KG/M2 | TEMPERATURE: 98.3 F | SYSTOLIC BLOOD PRESSURE: 118 MMHG | OXYGEN SATURATION: 98 % | RESPIRATION RATE: 18 BRPM

## 2025-01-10 DIAGNOSIS — I10 PRIMARY HYPERTENSION: ICD-10-CM

## 2025-01-10 DIAGNOSIS — R53.83 OTHER FATIGUE: ICD-10-CM

## 2025-01-10 DIAGNOSIS — G25.81 RLS (RESTLESS LEGS SYNDROME): Primary | ICD-10-CM

## 2025-01-10 DIAGNOSIS — G47.33 OSA (OBSTRUCTIVE SLEEP APNEA): ICD-10-CM

## 2025-01-10 PROCEDURE — 3074F SYST BP LT 130 MM HG: CPT | Performed by: INTERNAL MEDICINE

## 2025-01-10 PROCEDURE — 99204 OFFICE O/P NEW MOD 45 MIN: CPT | Performed by: INTERNAL MEDICINE

## 2025-01-10 PROCEDURE — 3078F DIAST BP <80 MM HG: CPT | Performed by: INTERNAL MEDICINE

## 2025-01-10 NOTE — PROGRESS NOTES
Nicole Bucio Ubaldojessica presents today for   Chief Complaint   Patient presents with    New Patient     Referred by Dr.Sharisse Pacheco for restless legs syndrome       Is someone accompanying this pt? No    Is the patient using any DME equipment during OV? No    -DME Company NA    Depression Screenin/7/2025     9:01 AM   PHQ-9 Questionaire   Little interest or pleasure in doing things 0   Feeling down, depressed, or hopeless 0   Trouble falling or staying asleep, or sleeping too much 0   Feeling tired or having little energy 0   Poor appetite or overeating 0   Feeling bad about yourself - or that you are a failure or have let yourself or your family down 0   Trouble concentrating on things, such as reading the newspaper or watching television 0   Moving or speaking so slowly that other people could have noticed. Or the opposite - being so fidgety or restless that you have been moving around a lot more than usual 0   Thoughts that you would be better off dead, or of hurting yourself in some way 0   PHQ-9 Total Score 0   If you checked off any problems, how difficult have these problems made it for you to do your work, take care of things at home, or get along with other people? 0       Learning Needs Questionnaire:     No question data found.      Fall Risk:          No data to display                 Abuse Screening:          No data to display                  Coordination of Care:    1. Have you been to the ER, urgent care clinic since your last visit? Hospitalized since your last visit? No    2. Have you seen or consulted any other health care providers outside of the [facility] since your last visit? Include any pap smears or colon screening. No    Medication list has been update per patient.        
(SINGULAIR) 10 MG tablet Take 1 tablet by mouth nightly 30 tablet 2    levothyroxine (SYNTHROID) 112 MCG tablet Take 1 tablet by mouth daily 90 tablet 1    meloxicam (MOBIC) 15 MG tablet Take 1 tablet by mouth daily      traMADol (ULTRAM) 50 MG tablet Take 1 tablet by mouth every 8 hours as needed for Pain.      VENLAFAXINE HCL PO Take 50 mg by mouth daily      ustekinumab (STELARA) 90 MG/ML SOSY prefilled syringe Inject 1 mL into the skin      albuterol sulfate HFA (VENTOLIN HFA) 108 (90 Base) MCG/ACT inhaler Inhale 2 puffs into the lungs 4 times daily as needed for Wheezing 18 g 5     No current facility-administered medications for this visit.         Review of Systems:  HEENT: No epistaxis, no nasal drainage, no difficulty in swallowing, no redness in eyes  Respiratory: as above  Cardiovascular: no chest pain, no palpitations, no chronic leg edema, no syncope  Gastrointestinal: no abd pain, no vomiting, no diarrhea, no bleeding symptoms  Genitourinary: No urinary symptoms or hematuria  Integument/breast: No ulcers or rashes  Musculoskeletal:Neg  Neurological: No focal weakness, no seizures, no headaches  Behvioral/Psych: No anxiety, no depression  Constitutional: No fever, no chills, no weight loss, no night sweats     Objective:   /75 (Site: Left Upper Arm, Position: Sitting, Cuff Size: Large Adult)   Pulse 83   Temp 98.3 °F (36.8 °C) (Temporal)   Resp 18   Wt 84 kg (185 lb 3.2 oz)   SpO2 98% Comment: RA Rest  BMI 29.01 kg/m²      Physical Exam: Neck size is 14  There were no vitals filed for this visit.   Malampati 3  Chin: No retrognathia, dentures  General: comfortable, no acute distress  HEENT: pupils reactive, sclera anicteric, EOM intact by inspection  Neck: No visible adenopathy or JVD  CVS: S1S2 no murmurs  RS: Mod AE bilaterally, no tactile fremitus or egophony, no accessory muscle use  Neuro: non focal, awake, alert  Extrm: no leg edema,   Skin: No visible skin rash    Data review:   No

## 2025-01-15 LAB
ANION GAP SERPL CALCULATED.3IONS-SCNC: 10 MMOL/L (ref 3–15)
BASOPHILS # BLD: 1 % (ref 0–2)
BASOPHILS ABSOLUTE: 0.1 K/UL (ref 0–0.2)
BUN BLDV-MCNC: 17 MG/DL (ref 6–22)
CALCIUM SERPL-MCNC: 9.8 MG/DL (ref 8.4–10.5)
CHLORIDE BLD-SCNC: 100 MMOL/L (ref 98–110)
CO2: 30 MMOL/L (ref 20–32)
CREAT SERPL-MCNC: 0.8 MG/DL (ref 0.5–1.2)
EOSINOPHIL # BLD: 2 % (ref 0–6)
EOSINOPHILS ABSOLUTE: 0.2 K/UL (ref 0–0.5)
GFR, ESTIMATED: >60 ML/MIN/1.73 SQ.M.
GLUCOSE: 64 MG/DL (ref 70–99)
HCT VFR BLD CALC: 38.3 % (ref 35.1–48)
HEMOGLOBIN: 12.4 G/DL (ref 11.7–16)
LYMPHOCYTES # BLD: 40 % (ref 20–45)
LYMPHOCYTES ABSOLUTE: 3.1 K/UL (ref 1–4.8)
MCH RBC QN AUTO: 30 PG (ref 26–34)
MCHC RBC AUTO-ENTMCNC: 32 G/DL (ref 31–36)
MCV RBC AUTO: 93 FL (ref 80–99)
MONOCYTES ABSOLUTE: 1.1 K/UL (ref 0.1–1)
MONOCYTES: 14 % (ref 3–12)
NEUTROPHILS ABSOLUTE: 3.4 K/UL (ref 1.8–7.7)
NEUTROPHILS SEGMENTED: 43 % (ref 40–75)
PDW BLD-RTO: 13.2 % (ref 10–15.5)
PLATELET # BLD: 185 K/UL (ref 140–440)
PMV BLD AUTO: 13.3 FL (ref 9–13)
POTASSIUM SERPL-SCNC: 4.1 MMOL/L (ref 3.5–5.5)
RBC # BLD: 4.13 M/UL (ref 3.8–5.2)
SODIUM BLD-SCNC: 140 MMOL/L (ref 133–145)
T3 FREE: 2.2 PG/ML (ref 2.3–4.2)
T4 FREE: 1.6 NG/DL (ref 0.9–1.8)
TSH SERPL DL<=0.05 MIU/L-ACNC: 0.05 MCU/ML (ref 0.27–4.2)
WBC # BLD: 7.9 K/UL (ref 4–11)

## 2025-01-22 DIAGNOSIS — E03.9 HYPOTHYROIDISM, UNSPECIFIED TYPE: ICD-10-CM

## 2025-01-22 RX ORDER — LEVOTHYROXINE SODIUM 100 UG/1
100 TABLET ORAL DAILY
Qty: 30 TABLET | Refills: 1 | Status: SHIPPED | OUTPATIENT
Start: 2025-01-22

## 2025-01-23 ENCOUNTER — HOSPITAL ENCOUNTER (OUTPATIENT)
Facility: HOSPITAL | Age: 50
Discharge: HOME | End: 2025-01-26
Payer: COMMERCIAL

## 2025-01-23 DIAGNOSIS — E03.9 HYPOTHYROIDISM, UNSPECIFIED TYPE: ICD-10-CM

## 2025-01-23 DIAGNOSIS — G25.81 RLS (RESTLESS LEGS SYNDROME): ICD-10-CM

## 2025-01-23 PROCEDURE — 76536 US EXAM OF HEAD AND NECK: CPT

## 2025-01-27 DIAGNOSIS — G25.81 RLS (RESTLESS LEGS SYNDROME): ICD-10-CM

## 2025-01-29 ENCOUNTER — HOSPITAL ENCOUNTER (OUTPATIENT)
Dept: SLEEP MEDICINE | Facility: HOSPITAL | Age: 50
Discharge: HOME | End: 2025-02-01
Attending: INTERNAL MEDICINE
Payer: COMMERCIAL

## 2025-01-29 DIAGNOSIS — G47.33 OSA (OBSTRUCTIVE SLEEP APNEA): ICD-10-CM

## 2025-01-29 DIAGNOSIS — R53.83 OTHER FATIGUE: ICD-10-CM

## 2025-01-29 DIAGNOSIS — G25.81 RLS (RESTLESS LEGS SYNDROME): ICD-10-CM

## 2025-01-29 PROCEDURE — 95810 POLYSOM 6/> YRS 4/> PARAM: CPT

## 2025-01-30 VITALS
DIASTOLIC BLOOD PRESSURE: 64 MMHG | SYSTOLIC BLOOD PRESSURE: 99 MMHG | HEART RATE: 66 BPM | HEIGHT: 67 IN | BODY MASS INDEX: 29.06 KG/M2 | WEIGHT: 185.13 LBS

## 2025-01-30 RX ORDER — PREGABALIN 200 MG/1
200 CAPSULE ORAL 2 TIMES DAILY
Qty: 60 CAPSULE | Refills: 5 | Status: SHIPPED | OUTPATIENT
Start: 2025-01-30 | End: 2025-07-29

## 2025-01-30 ASSESSMENT — SLEEP AND FATIGUE QUESTIONNAIRES
HOW LIKELY ARE YOU TO NOD OFF OR FALL ASLEEP IN A CAR, WHILE STOPPED FOR A FEW MINUTES IN TRAFFIC: WOULD NEVER DOZE
HOW LIKELY ARE YOU TO NOD OFF OR FALL ASLEEP WHILE SITTING AND TALKING TO SOMEONE: WOULD NEVER DOZE
HOW LIKELY ARE YOU TO NOD OFF OR FALL ASLEEP WHILE SITTING INACTIVE IN A PUBLIC PLACE: WOULD NEVER DOZE
HOW LIKELY ARE YOU TO NOD OFF OR FALL ASLEEP WHILE LYING DOWN TO REST IN THE AFTERNOON WHEN CIRCUMSTANCES PERMIT: MODERATE CHANCE OF DOZING
ESS TOTAL SCORE: 10
HOW LIKELY ARE YOU TO NOD OFF OR FALL ASLEEP WHILE WATCHING TV: MODERATE CHANCE OF DOZING
HOW LIKELY ARE YOU TO NOD OFF OR FALL ASLEEP WHILE SITTING QUIETLY AFTER LUNCH WITHOUT ALCOHOL: MODERATE CHANCE OF DOZING
HOW LIKELY ARE YOU TO NOD OFF OR FALL ASLEEP WHILE SITTING AND READING: SLIGHT CHANCE OF DOZING
HOW LIKELY ARE YOU TO NOD OFF OR FALL ASLEEP WHEN YOU ARE A PASSENGER IN A CAR FOR AN HOUR WITHOUT A BREAK: HIGH CHANCE OF DOZING

## 2025-01-31 DIAGNOSIS — J45.909 MILD ASTHMA WITHOUT COMPLICATION, UNSPECIFIED WHETHER PERSISTENT: ICD-10-CM

## 2025-01-31 DIAGNOSIS — I10 PRIMARY HYPERTENSION: ICD-10-CM

## 2025-01-31 NOTE — TELEPHONE ENCOUNTER
Last seen 1/7/2025   Last labs 1/15/2025  Last filled  11/6/2024 lisinopril                     11/6/2024 montelukast   Next appointment 2/27/2025     Lab Results   Component Value Date     01/15/2025    K 4.1 01/15/2025     01/15/2025    CO2 30 01/15/2025    BUN 17 01/15/2025    CREATININE 0.8 01/15/2025    GLUCOSE 64 (L) 01/15/2025    CALCIUM 9.8 01/15/2025    BILITOT 0.3 10/23/2024    ALKPHOS 40 10/23/2024    AST 14 10/23/2024    ALT 9 10/23/2024    LABGLOM >60.0 01/15/2025    AGRATIO 1.4 10/23/2024    GLOB 2.9 10/23/2024

## 2025-02-04 RX ORDER — LISINOPRIL 5 MG/1
5 TABLET ORAL DAILY
Qty: 30 TABLET | Refills: 2 | Status: SHIPPED | OUTPATIENT
Start: 2025-02-04

## 2025-02-04 RX ORDER — MONTELUKAST SODIUM 10 MG/1
10 TABLET ORAL NIGHTLY
Qty: 30 TABLET | Refills: 2 | Status: SHIPPED | OUTPATIENT
Start: 2025-02-04

## 2025-02-06 ENCOUNTER — TELEPHONE (OUTPATIENT)
Facility: HOSPITAL | Age: 50
End: 2025-02-06

## 2025-02-07 ENCOUNTER — TELEPHONE (OUTPATIENT)
Facility: HOSPITAL | Age: 50
End: 2025-02-07

## 2025-02-07 RX ORDER — PREGABALIN 200 MG/1
200 CAPSULE ORAL 2 TIMES DAILY
Qty: 60 CAPSULE | Refills: 5 | OUTPATIENT
Start: 2025-02-07 | End: 2025-08-06

## 2025-02-13 NOTE — TELEPHONE ENCOUNTER
Last seen 1/7/2025   Last labs 01/15/2025  Last filled  01/01/2021  Next appointment 3/4/2025     Lab Results   Component Value Date     01/15/2025    K 4.1 01/15/2025     01/15/2025    CO2 30 01/15/2025    BUN 17 01/15/2025    CREATININE 0.8 01/15/2025    GLUCOSE 64 (L) 01/15/2025    CALCIUM 9.8 01/15/2025    BILITOT 0.3 10/23/2024    ALKPHOS 40 10/23/2024    AST 14 10/23/2024    ALT 9 10/23/2024    LABGLOM >60.0 01/15/2025    AGRATIO 1.4 10/23/2024    GLOB 2.9 10/23/2024

## 2025-02-18 RX ORDER — VENLAFAXINE 50 MG/1
50 TABLET ORAL DAILY
Qty: 30 TABLET | Refills: 0 | Status: SHIPPED | OUTPATIENT
Start: 2025-02-18

## 2025-02-22 DIAGNOSIS — E03.9 HYPOTHYROIDISM, UNSPECIFIED TYPE: ICD-10-CM

## 2025-02-24 NOTE — TELEPHONE ENCOUNTER
Last seen 1/7/2025   Last labs 1/15/2025  Last filled  1/22/2025 requesting a 90 day supply per pharmacy note  Next appointment 3/4/2025     Lab Results   Component Value Date     01/15/2025    K 4.1 01/15/2025     01/15/2025    CO2 30 01/15/2025    BUN 17 01/15/2025    CREATININE 0.8 01/15/2025    GLUCOSE 64 (L) 01/15/2025    CALCIUM 9.8 01/15/2025    BILITOT 0.3 10/23/2024    ALKPHOS 40 10/23/2024    AST 14 10/23/2024    ALT 9 10/23/2024    LABGLOM >60.0 01/15/2025    AGRATIO 1.4 10/23/2024    GLOB 2.9 10/23/2024

## 2025-02-26 ENCOUNTER — TELEPHONE (OUTPATIENT)
Facility: HOSPITAL | Age: 50
End: 2025-02-26

## 2025-02-26 RX ORDER — LEVOTHYROXINE SODIUM 100 UG/1
100 TABLET ORAL DAILY
Qty: 90 TABLET | Refills: 1 | OUTPATIENT
Start: 2025-02-26

## 2025-02-27 ENCOUNTER — TELEPHONE (OUTPATIENT)
Facility: HOSPITAL | Age: 50
End: 2025-02-27

## 2025-03-03 SDOH — ECONOMIC STABILITY: FOOD INSECURITY: WITHIN THE PAST 12 MONTHS, YOU WORRIED THAT YOUR FOOD WOULD RUN OUT BEFORE YOU GOT MONEY TO BUY MORE.: PATIENT DECLINED

## 2025-03-03 SDOH — ECONOMIC STABILITY: FOOD INSECURITY: WITHIN THE PAST 12 MONTHS, THE FOOD YOU BOUGHT JUST DIDN'T LAST AND YOU DIDN'T HAVE MONEY TO GET MORE.: PATIENT DECLINED

## 2025-03-03 SDOH — ECONOMIC STABILITY: TRANSPORTATION INSECURITY
IN THE PAST 12 MONTHS, HAS LACK OF TRANSPORTATION KEPT YOU FROM MEETINGS, WORK, OR FROM GETTING THINGS NEEDED FOR DAILY LIVING?: NO

## 2025-03-03 SDOH — ECONOMIC STABILITY: INCOME INSECURITY: IN THE LAST 12 MONTHS, WAS THERE A TIME WHEN YOU WERE NOT ABLE TO PAY THE MORTGAGE OR RENT ON TIME?: NO

## 2025-03-03 SDOH — ECONOMIC STABILITY: TRANSPORTATION INSECURITY
IN THE PAST 12 MONTHS, HAS THE LACK OF TRANSPORTATION KEPT YOU FROM MEDICAL APPOINTMENTS OR FROM GETTING MEDICATIONS?: NO

## 2025-03-04 ENCOUNTER — OFFICE VISIT (OUTPATIENT)
Facility: CLINIC | Age: 50
End: 2025-03-04
Payer: COMMERCIAL

## 2025-03-04 VITALS
TEMPERATURE: 98.3 F | OXYGEN SATURATION: 97 % | HEIGHT: 67 IN | WEIGHT: 186.3 LBS | RESPIRATION RATE: 16 BRPM | SYSTOLIC BLOOD PRESSURE: 108 MMHG | HEART RATE: 91 BPM | BODY MASS INDEX: 29.24 KG/M2 | DIASTOLIC BLOOD PRESSURE: 75 MMHG

## 2025-03-04 DIAGNOSIS — E03.9 HYPOTHYROIDISM, UNSPECIFIED TYPE: ICD-10-CM

## 2025-03-04 DIAGNOSIS — M50.30 DEGENERATIVE DISC DISEASE, CERVICAL: Primary | ICD-10-CM

## 2025-03-04 PROCEDURE — 3078F DIAST BP <80 MM HG: CPT

## 2025-03-04 PROCEDURE — 3074F SYST BP LT 130 MM HG: CPT

## 2025-03-04 PROCEDURE — 99214 OFFICE O/P EST MOD 30 MIN: CPT

## 2025-03-04 RX ORDER — FOLIC ACID 1 MG/1
1 TABLET ORAL DAILY
COMMUNITY
Start: 2025-02-28 | End: 2025-08-27

## 2025-03-04 RX ORDER — METHOTREXATE 2.5 MG/1
15 TABLET ORAL WEEKLY
COMMUNITY
Start: 2025-02-28

## 2025-03-04 SDOH — ECONOMIC STABILITY: FOOD INSECURITY: WITHIN THE PAST 12 MONTHS, THE FOOD YOU BOUGHT JUST DIDN'T LAST AND YOU DIDN'T HAVE MONEY TO GET MORE.: NEVER TRUE

## 2025-03-04 SDOH — ECONOMIC STABILITY: FOOD INSECURITY: WITHIN THE PAST 12 MONTHS, YOU WORRIED THAT YOUR FOOD WOULD RUN OUT BEFORE YOU GOT MONEY TO BUY MORE.: NEVER TRUE

## 2025-03-04 ASSESSMENT — PATIENT HEALTH QUESTIONNAIRE - PHQ9
6. FEELING BAD ABOUT YOURSELF - OR THAT YOU ARE A FAILURE OR HAVE LET YOURSELF OR YOUR FAMILY DOWN: NOT AT ALL
8. MOVING OR SPEAKING SO SLOWLY THAT OTHER PEOPLE COULD HAVE NOTICED. OR THE OPPOSITE, BEING SO FIGETY OR RESTLESS THAT YOU HAVE BEEN MOVING AROUND A LOT MORE THAN USUAL: NOT AT ALL
SUM OF ALL RESPONSES TO PHQ QUESTIONS 1-9: 0
5. POOR APPETITE OR OVEREATING: NOT AT ALL
SUM OF ALL RESPONSES TO PHQ QUESTIONS 1-9: 0
1. LITTLE INTEREST OR PLEASURE IN DOING THINGS: NOT AT ALL
10. IF YOU CHECKED OFF ANY PROBLEMS, HOW DIFFICULT HAVE THESE PROBLEMS MADE IT FOR YOU TO DO YOUR WORK, TAKE CARE OF THINGS AT HOME, OR GET ALONG WITH OTHER PEOPLE: NOT DIFFICULT AT ALL
SUM OF ALL RESPONSES TO PHQ QUESTIONS 1-9: 0
9. THOUGHTS THAT YOU WOULD BE BETTER OFF DEAD, OR OF HURTING YOURSELF: NOT AT ALL
SUM OF ALL RESPONSES TO PHQ QUESTIONS 1-9: 0
2. FEELING DOWN, DEPRESSED OR HOPELESS: NOT AT ALL
4. FEELING TIRED OR HAVING LITTLE ENERGY: NOT AT ALL
7. TROUBLE CONCENTRATING ON THINGS, SUCH AS READING THE NEWSPAPER OR WATCHING TELEVISION: NOT AT ALL
3. TROUBLE FALLING OR STAYING ASLEEP: NOT AT ALL

## 2025-03-04 ASSESSMENT — ANXIETY QUESTIONNAIRES
GAD7 TOTAL SCORE: 0
7. FEELING AFRAID AS IF SOMETHING AWFUL MIGHT HAPPEN: NOT AT ALL
2. NOT BEING ABLE TO STOP OR CONTROL WORRYING: NOT AT ALL
4. TROUBLE RELAXING: NOT AT ALL
3. WORRYING TOO MUCH ABOUT DIFFERENT THINGS: NOT AT ALL
IF YOU CHECKED OFF ANY PROBLEMS ON THIS QUESTIONNAIRE, HOW DIFFICULT HAVE THESE PROBLEMS MADE IT FOR YOU TO DO YOUR WORK, TAKE CARE OF THINGS AT HOME, OR GET ALONG WITH OTHER PEOPLE: NOT DIFFICULT AT ALL
5. BEING SO RESTLESS THAT IT IS HARD TO SIT STILL: NOT AT ALL
6. BECOMING EASILY ANNOYED OR IRRITABLE: NOT AT ALL
1. FEELING NERVOUS, ANXIOUS, OR ON EDGE: NOT AT ALL

## 2025-03-04 ASSESSMENT — ENCOUNTER SYMPTOMS: SHORTNESS OF BREATH: 0

## 2025-03-04 NOTE — PROGRESS NOTES
Nicole Ennis presents today for   Chief Complaint   Patient presents with    Follow-up     Is someone accompanying this pt? No    Is the patient using any DME equipment during OV? No    Depression Screening:      3/4/2025    10:55 AM 1/7/2025     9:01 AM 11/6/2024     8:50 AM 10/23/2024     8:18 AM   PHQ-9 Questionaire   Little interest or pleasure in doing things 0 0 0 0   Feeling down, depressed, or hopeless 0 0 0 0   Trouble falling or staying asleep, or sleeping too much 0 0 0    Feeling tired or having little energy 0 0 0    Poor appetite or overeating 0 0 0    Feeling bad about yourself - or that you are a failure or have let yourself or your family down 0 0 0    Trouble concentrating on things, such as reading the newspaper or watching television 0 0 0    Moving or speaking so slowly that other people could have noticed. Or the opposite - being so fidgety or restless that you have been moving around a lot more than usual 0 0 0    Thoughts that you would be better off dead, or of hurting yourself in some way 0 0 0    PHQ-9 Total Score 0 0 0 0   If you checked off any problems, how difficult have these problems made it for you to do your work, take care of things at home, or get along with other people? 0 0 0         MARCO ANTONIO 7-Anxiety       3/4/2025    10:56 AM 1/7/2025     9:01 AM 11/6/2024     8:50 AM 10/23/2024     8:18 AM   MARCO ANTONIO-7 SCREENING   Feeling nervous, anxious, or on edge Not at all Not at all More than half the days More than half the days   Not being able to stop or control worrying Not at all Not at all More than half the days More than half the days   Worrying too much about different things Not at all Not at all Several days More than half the days   Trouble relaxing Not at all Not at all More than half the days Nearly every day   Being so restless that it is hard to sit still Not at all Not at all Several days More than half the days   Becoming easily annoyed or irritable Not at all Not at
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
## 2025-03-05 PROBLEM — F19.11 HISTORY OF SUBSTANCE ABUSE (HCC): Status: ACTIVE | Noted: 2025-03-05

## 2025-03-05 NOTE — ASSESSMENT & PLAN NOTE
Worsening  Will send referral to pain management per patient request  Also may benefit from spine specialist referral however patient would like to see pain management first

## 2025-03-05 NOTE — ASSESSMENT & PLAN NOTE
Uncontrolled  Continue levothyroxine 100 mcg daily  Repeat labs ordered today  Advised to complete as soon as possible and will adjust medication accordingly

## 2025-03-10 ENCOUNTER — COMMUNITY OUTREACH (OUTPATIENT)
Facility: CLINIC | Age: 50
End: 2025-03-10

## 2025-03-14 ENCOUNTER — OFFICE VISIT (OUTPATIENT)
Age: 50
End: 2025-03-14
Payer: COMMERCIAL

## 2025-03-14 VITALS
RESPIRATION RATE: 18 BRPM | DIASTOLIC BLOOD PRESSURE: 76 MMHG | HEART RATE: 85 BPM | BODY MASS INDEX: 29.6 KG/M2 | SYSTOLIC BLOOD PRESSURE: 120 MMHG | TEMPERATURE: 98.7 F | OXYGEN SATURATION: 100 % | WEIGHT: 189 LBS

## 2025-03-14 DIAGNOSIS — Z99.89 CPAP (CONTINUOUS POSITIVE AIRWAY PRESSURE) DEPENDENCE: ICD-10-CM

## 2025-03-14 DIAGNOSIS — G25.81 RLS (RESTLESS LEGS SYNDROME): ICD-10-CM

## 2025-03-14 DIAGNOSIS — R53.83 OTHER FATIGUE: ICD-10-CM

## 2025-03-14 DIAGNOSIS — G47.33 OSA (OBSTRUCTIVE SLEEP APNEA): Primary | ICD-10-CM

## 2025-03-14 PROCEDURE — 3074F SYST BP LT 130 MM HG: CPT | Performed by: INTERNAL MEDICINE

## 2025-03-14 PROCEDURE — 3078F DIAST BP <80 MM HG: CPT | Performed by: INTERNAL MEDICINE

## 2025-03-14 PROCEDURE — 99214 OFFICE O/P EST MOD 30 MIN: CPT | Performed by: INTERNAL MEDICINE

## 2025-03-14 NOTE — PROGRESS NOTES
Nicole Bucio Ubaldobrandieradha presents today for   Chief Complaint   Patient presents with    Follow-up     RLS (restless legs syndrome)    Results     Sleep Study done 01/29/25       Is someone accompanying this pt? No    Is the patient using any DME equipment during OV? NO    -DME Company NA    Depression Screening:        3/4/2025    10:55 AM   PHQ-9 Questionaire   Little interest or pleasure in doing things 0   Feeling down, depressed, or hopeless 0   Trouble falling or staying asleep, or sleeping too much 0   Feeling tired or having little energy 0   Poor appetite or overeating 0   Feeling bad about yourself - or that you are a failure or have let yourself or your family down 0   Trouble concentrating on things, such as reading the newspaper or watching television 0   Moving or speaking so slowly that other people could have noticed. Or the opposite - being so fidgety or restless that you have been moving around a lot more than usual 0   Thoughts that you would be better off dead, or of hurting yourself in some way 0   PHQ-9 Total Score 0   If you checked off any problems, how difficult have these problems made it for you to do your work, take care of things at home, or get along with other people? 0       Learning Needs Questionnaire:     No question data found.      Fall Risk:          No data to display                 Abuse Screening:          No data to display                  Coordination of Care:    1. Have you been to the ER, urgent care clinic since your last visit? Hospitalized since your last visit? No    2. Have you seen or consulted any other health care providers outside of the [facility] since your last visit? Include any pap smears or colon screening.     Medication list has been update per patient.

## 2025-03-14 NOTE — PROGRESS NOTES
David Gomez M.D  Pulmonary Critical Care & Sleep Medicine     Sleep Medicine Note    Name: Nicole Ennis     : 1975     Date: 3/14/2025      Referring provider: Dr Junior Zarate   PCP: Bakari Gonzales APRN - CNP   IMPRESSION:   49-year-old female who suffers from restless leg syndrome on multiple medication recently started on Mirapex by neurologist referred for evaluation of sleep apnea due to snoring excessive daytime fatigue ESS of 11 and snore arousals  Mild sleep apnea with AHI of 10, oxygen vijay of 84%  With significant sleepiness and ESS of 11 and symptoms of RLS patient will benefit from treatment of sleep apnea  Patient came for sleep study follow-up, discussed all the treatment options with the patient, including CPAP-auto CPAP, dental device and inspire.  After discussing all the pros and cons of the management, and due to severity of patient condition with daytime fatigue and comorbid condition, patient decided to proceed with CPAP, discussed at length about hypoglossal nerve stimulator which she does not wish to proceed, also patient has denture and she is not a candidate for mouthguard  Prescription/referral for auto CPAP 5-15 sent  Will follow-up 3 months with compliance download  Restless leg syndrome:   Again symptoms are out of proportion  She does not classically fit for augmentation however there is a strong possibility as patient is having symptoms in the arms and legs  She also suggests that some symptoms of back course, mainly at nighttime, could be unrelated to RLS and might be related to her underlying fibromyalgia or psoriatic arthritis  Patient was started on Mirapex with Lyrica however she stated that she started having issues with eating at nighttime and stopped using Mirapex  Optimal treatment of PATRIA will help with better control of symptoms  Abnormal TSH:   History of 0.03 was noted-repeat 1 is still abnormal  Again thyroid disorders can contribute to

## 2025-03-17 NOTE — TELEPHONE ENCOUNTER
Last seen 3/4/2025   Last labs 1/15/2025  Last filled  2/18/2025 requesting a 90 day supply  Next appointment 4/1/2025     Lab Results   Component Value Date     01/15/2025    K 4.1 01/15/2025     01/15/2025    CO2 30 01/15/2025    BUN 17 01/15/2025    CREATININE 0.8 01/15/2025    GLUCOSE 64 (L) 01/15/2025    CALCIUM 9.8 01/15/2025    BILITOT 0.3 10/23/2024    ALKPHOS 40 10/23/2024    AST 14 10/23/2024    ALT 9 10/23/2024    LABGLOM >60.0 01/15/2025    AGRATIO 1.4 10/23/2024    GLOB 2.9 10/23/2024

## 2025-03-19 RX ORDER — VENLAFAXINE 50 MG/1
50 TABLET ORAL DAILY
Qty: 90 TABLET | Refills: 1 | OUTPATIENT
Start: 2025-03-19

## 2025-03-24 RX ORDER — VENLAFAXINE 50 MG/1
50 TABLET ORAL DAILY
Qty: 30 TABLET | Refills: 0 | OUTPATIENT
Start: 2025-03-24

## 2025-03-24 NOTE — TELEPHONE ENCOUNTER
Last seen 3/4/2025   Last labs 1/15/2025  Last filled  2/18/2025  Next appointment 4/1/2025     Lab Results   Component Value Date     01/15/2025    K 4.1 01/15/2025     01/15/2025    CO2 30 01/15/2025    BUN 17 01/15/2025    CREATININE 0.8 01/15/2025    GLUCOSE 64 (L) 01/15/2025    CALCIUM 9.8 01/15/2025    BILITOT 0.3 10/23/2024    ALKPHOS 40 10/23/2024    AST 14 10/23/2024    ALT 9 10/23/2024    LABGLOM >60.0 01/15/2025    AGRATIO 1.4 10/23/2024    GLOB 2.9 10/23/2024

## 2025-03-26 RX ORDER — VENLAFAXINE 50 MG/1
50 TABLET ORAL DAILY
Qty: 30 TABLET | Refills: 0 | OUTPATIENT
Start: 2025-03-26

## 2025-03-26 RX ORDER — VENLAFAXINE 50 MG/1
50 TABLET ORAL DAILY
Qty: 30 TABLET | Refills: 2 | Status: SHIPPED | OUTPATIENT
Start: 2025-03-26

## 2025-04-04 ENCOUNTER — OFFICE VISIT (OUTPATIENT)
Age: 50
End: 2025-04-04

## 2025-04-04 VITALS
HEART RATE: 82 BPM | OXYGEN SATURATION: 100 % | DIASTOLIC BLOOD PRESSURE: 81 MMHG | BODY MASS INDEX: 29.66 KG/M2 | HEIGHT: 67 IN | TEMPERATURE: 98 F | WEIGHT: 189 LBS | SYSTOLIC BLOOD PRESSURE: 137 MMHG

## 2025-04-04 DIAGNOSIS — G25.81 RLS (RESTLESS LEGS SYNDROME): Primary | ICD-10-CM

## 2025-04-04 DIAGNOSIS — Z99.89 CPAP (CONTINUOUS POSITIVE AIRWAY PRESSURE) DEPENDENCE: ICD-10-CM

## 2025-04-04 DIAGNOSIS — L40.50 PA (PSORIATIC ARTHRITIS) (HCC): ICD-10-CM

## 2025-04-04 RX ORDER — PREGABALIN 150 MG/1
300 CAPSULE ORAL 2 TIMES DAILY
Qty: 120 CAPSULE | Refills: 5 | Status: CANCELLED | OUTPATIENT
Start: 2025-04-04

## 2025-04-04 NOTE — PROGRESS NOTES
SUBJECTIVE:   Nicole Ennis is a 49 y.o. female seen for a follow up visit regarding the following:     Chief Complaint   Patient presents with    Follow-up     Restless leg syndrome        HPI:  She returns today for follow up. Restless legs symptoms worsened.  She had a colonoscopy and sx worsened after the colonoscopy. On pramiprexole and lyrica but symptoms remain aggravated.           Past Medical History, Past Surgical History, Family history, Social History, and Medications were all reviewed with the patient today and updated as necessary.     Current Outpatient Medications   Medication Sig Dispense Refill    venlafaxine (EFFEXOR) 50 MG tablet Take 1 tablet by mouth daily 30 tablet 2    methotrexate (RHEUMATREX) 2.5 MG chemo tablet Take 6 tablets by mouth once a week      folic acid (FOLVITE) 1 MG tablet Take 1 tablet by mouth daily      lisinopril (PRINIVIL;ZESTRIL) 5 MG tablet TAKE 1 TABLET BY MOUTH EVERY DAY 30 tablet 2    montelukast (SINGULAIR) 10 MG tablet TAKE 1 TABLET BY MOUTH EVERY DAY AT NIGHT 30 tablet 2    pregabalin (LYRICA) 200 MG capsule Take 1 capsule by mouth 2 times daily for 180 days. Max Daily Amount: 400 mg 60 capsule 5    levothyroxine (SYNTHROID) 100 MCG tablet Take 1 tablet by mouth daily 30 tablet 1    vitamin D3 (CHOLECALCIFEROL) 125 MCG (5000 UT) TABS tablet Take 1 tablet by mouth daily      pramipexole (MIRAPEX) 0.5 MG tablet Take 1 tablet by mouth nightly 90 tablet 3    meloxicam (MOBIC) 15 MG tablet Take 1 tablet by mouth daily      traMADol (ULTRAM) 50 MG tablet Take 1 tablet by mouth every 8 hours as needed for Pain.      ustekinumab (STELARA) 90 MG/ML SOSY prefilled syringe Inject 1 mL into the skin      albuterol sulfate HFA (VENTOLIN HFA) 108 (90 Base) MCG/ACT inhaler Inhale 2 puffs into the lungs 4 times daily as needed for Wheezing 18 g 5     No current facility-administered medications for this visit.     Allergies   Allergen Reactions    Cephalexin Anaphylaxis and

## 2025-04-07 DIAGNOSIS — E03.9 HYPOTHYROIDISM, UNSPECIFIED TYPE: ICD-10-CM

## 2025-04-08 RX ORDER — LEVOTHYROXINE SODIUM 100 UG/1
100 TABLET ORAL DAILY
Qty: 30 TABLET | Refills: 1 | OUTPATIENT
Start: 2025-04-08

## 2025-04-15 ENCOUNTER — OFFICE VISIT (OUTPATIENT)
Facility: CLINIC | Age: 50
End: 2025-04-15
Payer: COMMERCIAL

## 2025-04-15 VITALS
HEART RATE: 84 BPM | HEIGHT: 67 IN | WEIGHT: 195.6 LBS | OXYGEN SATURATION: 99 % | RESPIRATION RATE: 18 BRPM | TEMPERATURE: 98.2 F | DIASTOLIC BLOOD PRESSURE: 69 MMHG | BODY MASS INDEX: 30.7 KG/M2 | SYSTOLIC BLOOD PRESSURE: 110 MMHG

## 2025-04-15 DIAGNOSIS — E87.6 HYPOKALEMIA: ICD-10-CM

## 2025-04-15 DIAGNOSIS — G25.81 RLS (RESTLESS LEGS SYNDROME): Primary | ICD-10-CM

## 2025-04-15 DIAGNOSIS — E03.9 HYPOTHYROIDISM, UNSPECIFIED TYPE: ICD-10-CM

## 2025-04-15 PROCEDURE — 99215 OFFICE O/P EST HI 40 MIN: CPT

## 2025-04-15 PROCEDURE — 3078F DIAST BP <80 MM HG: CPT

## 2025-04-15 PROCEDURE — 3074F SYST BP LT 130 MM HG: CPT

## 2025-04-15 RX ORDER — LEVOTHYROXINE SODIUM 88 UG/1
88 TABLET ORAL DAILY
Qty: 30 TABLET | Refills: 2 | Status: SHIPPED | OUTPATIENT
Start: 2025-04-15

## 2025-04-15 SDOH — ECONOMIC STABILITY: FOOD INSECURITY: WITHIN THE PAST 12 MONTHS, YOU WORRIED THAT YOUR FOOD WOULD RUN OUT BEFORE YOU GOT MONEY TO BUY MORE.: NEVER TRUE

## 2025-04-15 SDOH — ECONOMIC STABILITY: FOOD INSECURITY: WITHIN THE PAST 12 MONTHS, THE FOOD YOU BOUGHT JUST DIDN'T LAST AND YOU DIDN'T HAVE MONEY TO GET MORE.: NEVER TRUE

## 2025-04-15 ASSESSMENT — ANXIETY QUESTIONNAIRES
4. TROUBLE RELAXING: NOT AT ALL
GAD7 TOTAL SCORE: 0
6. BECOMING EASILY ANNOYED OR IRRITABLE: NOT AT ALL
2. NOT BEING ABLE TO STOP OR CONTROL WORRYING: NOT AT ALL
7. FEELING AFRAID AS IF SOMETHING AWFUL MIGHT HAPPEN: NOT AT ALL
3. WORRYING TOO MUCH ABOUT DIFFERENT THINGS: NOT AT ALL
IF YOU CHECKED OFF ANY PROBLEMS ON THIS QUESTIONNAIRE, HOW DIFFICULT HAVE THESE PROBLEMS MADE IT FOR YOU TO DO YOUR WORK, TAKE CARE OF THINGS AT HOME, OR GET ALONG WITH OTHER PEOPLE: NOT DIFFICULT AT ALL
1. FEELING NERVOUS, ANXIOUS, OR ON EDGE: NOT AT ALL
5. BEING SO RESTLESS THAT IT IS HARD TO SIT STILL: NOT AT ALL

## 2025-04-15 ASSESSMENT — PATIENT HEALTH QUESTIONNAIRE - PHQ9
SUM OF ALL RESPONSES TO PHQ QUESTIONS 1-9: 0
SUM OF ALL RESPONSES TO PHQ QUESTIONS 1-9: 0
3. TROUBLE FALLING OR STAYING ASLEEP: NOT AT ALL
7. TROUBLE CONCENTRATING ON THINGS, SUCH AS READING THE NEWSPAPER OR WATCHING TELEVISION: NOT AT ALL
10. IF YOU CHECKED OFF ANY PROBLEMS, HOW DIFFICULT HAVE THESE PROBLEMS MADE IT FOR YOU TO DO YOUR WORK, TAKE CARE OF THINGS AT HOME, OR GET ALONG WITH OTHER PEOPLE: NOT DIFFICULT AT ALL
4. FEELING TIRED OR HAVING LITTLE ENERGY: NOT AT ALL
SUM OF ALL RESPONSES TO PHQ QUESTIONS 1-9: 0
6. FEELING BAD ABOUT YOURSELF - OR THAT YOU ARE A FAILURE OR HAVE LET YOURSELF OR YOUR FAMILY DOWN: NOT AT ALL
5. POOR APPETITE OR OVEREATING: NOT AT ALL
9. THOUGHTS THAT YOU WOULD BE BETTER OFF DEAD, OR OF HURTING YOURSELF: NOT AT ALL
2. FEELING DOWN, DEPRESSED OR HOPELESS: NOT AT ALL
SUM OF ALL RESPONSES TO PHQ QUESTIONS 1-9: 0
8. MOVING OR SPEAKING SO SLOWLY THAT OTHER PEOPLE COULD HAVE NOTICED. OR THE OPPOSITE, BEING SO FIGETY OR RESTLESS THAT YOU HAVE BEEN MOVING AROUND A LOT MORE THAN USUAL: NOT AT ALL
1. LITTLE INTEREST OR PLEASURE IN DOING THINGS: NOT AT ALL

## 2025-04-15 ASSESSMENT — ENCOUNTER SYMPTOMS: SHORTNESS OF BREATH: 0

## 2025-04-15 NOTE — ASSESSMENT & PLAN NOTE
- Thyroid levels remain suboptimal, with a persistently low TSH level as of 03/2025.  - Dosage of levothyroxine will be reduced to 88 mcg.  - Repeat lab work will be conducted prior to the next appointment to monitor thyroid function.  - Review of thyroid function and medication effectiveness.

## 2025-04-15 NOTE — ASSESSMENT & PLAN NOTE
- Potassium levels noted to be slightly low, potentially related to recent discontinuation of methotrexate.  - Monitoring of potassium levels will be conducted.  - Advised to maintain a balanced diet rich in potassium.  - Evaluation of dietary intake and potential supplementation if necessary.

## 2025-04-15 NOTE — ASSESSMENT & PLAN NOTE
- Severe restless leg syndrome exacerbated by recent procedures, leading to significant sleep disturbances.  - Neurology follow-up is advised for further management and adherence to their recommendations.  - Iron studies will be conducted to assess for any deficiencies contributing to her symptoms.  - Continued monitoring of sleep patterns and symptom severity.

## 2025-04-15 NOTE — PROGRESS NOTES
Nicole Ennis presents today for   Chief Complaint   Patient presents with    Follow-Up from Hospital    Medication Problem     Was told Lisinopril increases uric acid     Is someone accompanying this pt? No    Is the patient using any DME equipment during OV? No    Depression Screenin/15/2025     9:43 AM 3/4/2025    10:55 AM 2025     9:01 AM 2024     8:50 AM 10/23/2024     8:18 AM   PHQ-9 Questionaire   Little interest or pleasure in doing things 0 0 0 0 0   Feeling down, depressed, or hopeless 0 0 0 0 0   Trouble falling or staying asleep, or sleeping too much 0 0 0 0    Feeling tired or having little energy 0 0 0 0    Poor appetite or overeating 0 0 0 0    Feeling bad about yourself - or that you are a failure or have let yourself or your family down 0 0 0 0    Trouble concentrating on things, such as reading the newspaper or watching television 0 0 0 0    Moving or speaking so slowly that other people could have noticed. Or the opposite - being so fidgety or restless that you have been moving around a lot more than usual 0 0 0 0    Thoughts that you would be better off dead, or of hurting yourself in some way 0 0 0 0    PHQ-9 Total Score 0 0 0 0 0   If you checked off any problems, how difficult have these problems made it for you to do your work, take care of things at home, or get along with other people? 0 0 0 0         MARCO ANTONIO 7-Anxiety       4/15/2025     9:43 AM 3/4/2025    10:56 AM 2025     9:01 AM 2024     8:50 AM 10/23/2024     8:18 AM   MARCO ANTONIO-7 SCREENING   Feeling nervous, anxious, or on edge Not at all Not at all Not at all More than half the days More than half the days   Not being able to stop or control worrying Not at all Not at all Not at all More than half the days More than half the days   Worrying too much about different things Not at all Not at all Not at all Several days More than half the days   Trouble relaxing Not at all Not at all Not at all More than half

## 2025-04-15 NOTE — PROGRESS NOTES
Nicole Ennis is a 49 y.o. female is seen on 4/15/2025 for Follow-Up from Hospital and Medication Problem (Was told Lisinopril increases uric acid)    Assessment & Plan:   1. RLS (restless legs syndrome)  Assessment & Plan:  - Severe restless leg syndrome exacerbated by recent procedures, leading to significant sleep disturbances.  - Neurology follow-up is advised for further management and adherence to their recommendations.  - Iron studies will be conducted to assess for any deficiencies contributing to her symptoms.  - Continued monitoring of sleep patterns and symptom severity.    Orders:  -     CBC with Auto Differential; Future  -     Comprehensive Metabolic Panel; Future  -     Iron and TIBC; Future  -     Ferritin; Future  2. Hypothyroidism, unspecified type  Assessment & Plan:  - Thyroid levels remain suboptimal, with a persistently low TSH level as of 03/2025.  - Dosage of levothyroxine will be reduced to 88 mcg.  - Repeat lab work will be conducted prior to the next appointment to monitor thyroid function.  - Review of thyroid function and medication effectiveness.  Orders:  -     levothyroxine (SYNTHROID) 88 MCG tablet; Take 1 tablet by mouth daily, Disp-30 tablet, R-2Normal  -     Thyroid Cascade Profile; Future  3. Hypokalemia  Assessment & Plan:  - Potassium levels noted to be slightly low, potentially related to recent discontinuation of methotrexate.  - Monitoring of potassium levels will be conducted.  - Advised to maintain a balanced diet rich in potassium.  - Evaluation of dietary intake and potential supplementation if necessary.  Plan:   - Concern about the potential impact of lisinopril on uric acid levels given her inflammatory arthritis.  - Current dosage of lisinopril 5 mg is unlikely to exacerbate her condition.  - Double-checking to ensure lisinopril is safe for continued use.  - Monitoring of inflammatory arthritis symptoms and medication effects.    Follow-up and Dispositions

## 2025-04-16 ENCOUNTER — PATIENT MESSAGE (OUTPATIENT)
Age: 50
End: 2025-04-16

## 2025-04-16 DIAGNOSIS — G25.81 RLS (RESTLESS LEGS SYNDROME): ICD-10-CM

## 2025-04-17 RX ORDER — TRAMADOL HYDROCHLORIDE 50 MG/1
50 TABLET ORAL EVERY 6 HOURS PRN
Qty: 120 TABLET | Refills: 1 | Status: SHIPPED | OUTPATIENT
Start: 2025-04-17 | End: 2025-06-16

## 2025-04-17 RX ORDER — PREGABALIN 200 MG/1
200 CAPSULE ORAL 3 TIMES DAILY
Qty: 90 CAPSULE | Refills: 5 | Status: SHIPPED | OUTPATIENT
Start: 2025-04-17 | End: 2025-10-14

## 2025-04-23 ENCOUNTER — OFFICE VISIT (OUTPATIENT)
Facility: CLINIC | Age: 50
End: 2025-04-23
Payer: COMMERCIAL

## 2025-04-23 VITALS
SYSTOLIC BLOOD PRESSURE: 119 MMHG | HEIGHT: 67 IN | TEMPERATURE: 98.3 F | RESPIRATION RATE: 18 BRPM | OXYGEN SATURATION: 99 % | DIASTOLIC BLOOD PRESSURE: 79 MMHG | BODY MASS INDEX: 30.61 KG/M2 | HEART RATE: 74 BPM | WEIGHT: 195 LBS

## 2025-04-23 DIAGNOSIS — M79.672 LEFT FOOT PAIN: Primary | ICD-10-CM

## 2025-04-23 PROCEDURE — 3074F SYST BP LT 130 MM HG: CPT

## 2025-04-23 PROCEDURE — 99214 OFFICE O/P EST MOD 30 MIN: CPT

## 2025-04-23 PROCEDURE — 3078F DIAST BP <80 MM HG: CPT

## 2025-04-23 SDOH — ECONOMIC STABILITY: FOOD INSECURITY: WITHIN THE PAST 12 MONTHS, THE FOOD YOU BOUGHT JUST DIDN'T LAST AND YOU DIDN'T HAVE MONEY TO GET MORE.: NEVER TRUE

## 2025-04-23 SDOH — ECONOMIC STABILITY: FOOD INSECURITY: WITHIN THE PAST 12 MONTHS, YOU WORRIED THAT YOUR FOOD WOULD RUN OUT BEFORE YOU GOT MONEY TO BUY MORE.: NEVER TRUE

## 2025-04-23 ASSESSMENT — ANXIETY QUESTIONNAIRES
5. BEING SO RESTLESS THAT IT IS HARD TO SIT STILL: NOT AT ALL
2. NOT BEING ABLE TO STOP OR CONTROL WORRYING: NOT AT ALL
3. WORRYING TOO MUCH ABOUT DIFFERENT THINGS: NOT AT ALL
GAD7 TOTAL SCORE: 0
6. BECOMING EASILY ANNOYED OR IRRITABLE: NOT AT ALL
4. TROUBLE RELAXING: NOT AT ALL
7. FEELING AFRAID AS IF SOMETHING AWFUL MIGHT HAPPEN: NOT AT ALL
1. FEELING NERVOUS, ANXIOUS, OR ON EDGE: NOT AT ALL
IF YOU CHECKED OFF ANY PROBLEMS ON THIS QUESTIONNAIRE, HOW DIFFICULT HAVE THESE PROBLEMS MADE IT FOR YOU TO DO YOUR WORK, TAKE CARE OF THINGS AT HOME, OR GET ALONG WITH OTHER PEOPLE: NOT DIFFICULT AT ALL

## 2025-04-23 ASSESSMENT — PATIENT HEALTH QUESTIONNAIRE - PHQ9
1. LITTLE INTEREST OR PLEASURE IN DOING THINGS: NOT AT ALL
10. IF YOU CHECKED OFF ANY PROBLEMS, HOW DIFFICULT HAVE THESE PROBLEMS MADE IT FOR YOU TO DO YOUR WORK, TAKE CARE OF THINGS AT HOME, OR GET ALONG WITH OTHER PEOPLE: NOT DIFFICULT AT ALL
3. TROUBLE FALLING OR STAYING ASLEEP: NOT AT ALL
8. MOVING OR SPEAKING SO SLOWLY THAT OTHER PEOPLE COULD HAVE NOTICED. OR THE OPPOSITE, BEING SO FIGETY OR RESTLESS THAT YOU HAVE BEEN MOVING AROUND A LOT MORE THAN USUAL: NOT AT ALL
SUM OF ALL RESPONSES TO PHQ QUESTIONS 1-9: 0
6. FEELING BAD ABOUT YOURSELF - OR THAT YOU ARE A FAILURE OR HAVE LET YOURSELF OR YOUR FAMILY DOWN: NOT AT ALL
7. TROUBLE CONCENTRATING ON THINGS, SUCH AS READING THE NEWSPAPER OR WATCHING TELEVISION: NOT AT ALL
9. THOUGHTS THAT YOU WOULD BE BETTER OFF DEAD, OR OF HURTING YOURSELF: NOT AT ALL
4. FEELING TIRED OR HAVING LITTLE ENERGY: NOT AT ALL
SUM OF ALL RESPONSES TO PHQ QUESTIONS 1-9: 0
2. FEELING DOWN, DEPRESSED OR HOPELESS: NOT AT ALL
5. POOR APPETITE OR OVEREATING: NOT AT ALL

## 2025-04-23 NOTE — PROGRESS NOTES
Nicole Ennis presents today for   Chief Complaint   Patient presents with    Foot Injury     Onset: 5 days ago  Location: left foot   Duration: constant  Characteristics: achy, sore  Associated symptoms: swelling  Aggravating factors: walking, standing too long  Relieving factors: Pt takes Tramadol doesn't work  Treatment: NA    Is someone accompanying this pt? No    Is the patient using any DME equipment during OV? No    Depression Screenin/23/2025     4:15 PM 4/15/2025     9:43 AM 3/4/2025    10:55 AM 2025     9:01 AM 2024     8:50 AM 10/23/2024     8:18 AM   PHQ-9 Questionaire   Little interest or pleasure in doing things 0 0 0 0 0 0   Feeling down, depressed, or hopeless 0 0 0 0 0 0   Trouble falling or staying asleep, or sleeping too much 0 0 0 0 0    Feeling tired or having little energy 0 0 0 0 0    Poor appetite or overeating 0 0 0 0 0    Feeling bad about yourself - or that you are a failure or have let yourself or your family down 0 0 0 0 0    Trouble concentrating on things, such as reading the newspaper or watching television 0 0 0 0 0    Moving or speaking so slowly that other people could have noticed. Or the opposite - being so fidgety or restless that you have been moving around a lot more than usual 0 0 0 0 0    Thoughts that you would be better off dead, or of hurting yourself in some way 0 0 0 0 0    PHQ-9 Total Score 0 0 0 0 0 0   If you checked off any problems, how difficult have these problems made it for you to do your work, take care of things at home, or get along with other people? 0 0 0 0 0         MARCO ANTONIO 7-Anxiety       2025     4:16 PM 4/15/2025     9:43 AM 3/4/2025    10:56 AM 2025     9:01 AM 2024     8:50 AM 10/23/2024     8:18 AM   MARCO ANTONIO-7 SCREENING   Feeling nervous, anxious, or on edge Not at all Not at all Not at all Not at all More than half the days More than half the days   Not being able to stop or control worrying Not at all Not at all Not

## 2025-04-25 ASSESSMENT — ENCOUNTER SYMPTOMS: SHORTNESS OF BREATH: 0

## 2025-04-25 NOTE — PROGRESS NOTES
Nicole Ennis is a 49 y.o. female is seen on 4/23/2025 for Foot Injury    Assessment & Plan:   1. Left foot pain  -     XR FOOT LEFT (MIN 3 VIEWS); Future  Plan: Will get xray today of left foot   Continue ice/heat therapy and OTC tylenol/NSAIDs PRN for pain   Awaiting xray results   Consider ultrasound in future due to discoloration of lower extremities  Follow-up and Dispositions    Return if symptoms worsen or fail to improve.       Subjective:     History of Present Illness  The patient presents for evaluation of left foot pain.    An episode of severe leg discomfort occurred last Thursday night, preventing sleep. The following night, Mirapex was taken at 3:00 AM to alleviate restlessness and facilitate sleep. Upon awakening, a significant hot flash was experienced, characterized by profuse sweating, akin to being drenched in water. This was accompanied by a sensation of impending vomiting, prompting her to rise abruptly and run to the end of the hallway, where she subsequently fell. Uncertainty remains regarding any injury to the foot from the fall. Pain is reported in the left foot, specifically in the pinky toe. Despite having a high pain tolerance and taking tramadol twice daily, pain persists. Pain management attempts included elevation and soaking the foot in Epsom salt.    Review of Systems   Respiratory:  Negative for shortness of breath.    Cardiovascular:  Negative for chest pain and leg swelling.   Neurological:  Negative for dizziness, light-headedness and headaches.      Objective:   /79 (BP Site: Left Upper Arm, Patient Position: Sitting, BP Cuff Size: Medium Adult)   Pulse 74   Temp 98.3 °F (36.8 °C) (Temporal)   Resp 18   Ht 1.702 m (5' 7\")   Wt 88.5 kg (195 lb)   SpO2 99%   BMI 30.54 kg/m²     Physical Exam  Extremities: Tenderness noted in the left foot, particularly around the pinky toe. Bilateral discoloration of lower extremities.   Skin: No visible abnormalities, cleaned

## 2025-05-01 ENCOUNTER — RESULTS FOLLOW-UP (OUTPATIENT)
Facility: CLINIC | Age: 50
End: 2025-05-01

## 2025-05-12 DIAGNOSIS — I10 PRIMARY HYPERTENSION: ICD-10-CM

## 2025-05-12 DIAGNOSIS — J45.909 MILD ASTHMA WITHOUT COMPLICATION, UNSPECIFIED WHETHER PERSISTENT: ICD-10-CM

## 2025-05-12 NOTE — TELEPHONE ENCOUNTER
Last seen 4/23/2025   Last labs 1/15/2025  Last filled  2/4/2025 montelukast                     2/4/2025 lisinopril   Next appointment 7/15/2025     Lab Results   Component Value Date     01/15/2025    K 4.1 01/15/2025     01/15/2025    CO2 30 01/15/2025    BUN 17 01/15/2025    CREATININE 0.8 01/15/2025    GLUCOSE 64 (L) 01/15/2025    CALCIUM 9.8 01/15/2025    BILITOT 0.3 10/23/2024    ALKPHOS 40 10/23/2024    AST 14 10/23/2024    ALT 9 10/23/2024    LABGLOM >60.0 01/15/2025    AGRATIO 1.4 10/23/2024    GLOB 2.9 10/23/2024

## 2025-05-14 RX ORDER — LISINOPRIL 5 MG/1
5 TABLET ORAL DAILY
Qty: 90 TABLET | Refills: 0 | Status: SHIPPED | OUTPATIENT
Start: 2025-05-14

## 2025-05-14 RX ORDER — MONTELUKAST SODIUM 10 MG/1
10 TABLET ORAL NIGHTLY
Qty: 90 TABLET | Refills: 0 | Status: SHIPPED | OUTPATIENT
Start: 2025-05-14

## 2025-05-22 ENCOUNTER — OFFICE VISIT (OUTPATIENT)
Age: 50
End: 2025-05-22
Payer: COMMERCIAL

## 2025-05-22 VITALS
SYSTOLIC BLOOD PRESSURE: 114 MMHG | BODY MASS INDEX: 29.98 KG/M2 | DIASTOLIC BLOOD PRESSURE: 80 MMHG | WEIGHT: 191 LBS | TEMPERATURE: 98.4 F | HEIGHT: 67 IN | OXYGEN SATURATION: 100 % | HEART RATE: 85 BPM

## 2025-05-22 DIAGNOSIS — G25.81 RLS (RESTLESS LEGS SYNDROME): Primary | ICD-10-CM

## 2025-05-22 DIAGNOSIS — Z86.718 HISTORY OF DEEP VENOUS THROMBOSIS: ICD-10-CM

## 2025-05-22 DIAGNOSIS — I83.813 VARICOSE VEINS OF BILATERAL LOWER EXTREMITIES WITH PAIN: ICD-10-CM

## 2025-05-22 DIAGNOSIS — L40.50 PA (PSORIATIC ARTHRITIS) (HCC): ICD-10-CM

## 2025-05-22 DIAGNOSIS — L40.50 PSORIATIC ARTHRITIS (HCC): ICD-10-CM

## 2025-05-22 DIAGNOSIS — Z99.89 CPAP (CONTINUOUS POSITIVE AIRWAY PRESSURE) DEPENDENCE: ICD-10-CM

## 2025-05-22 PROCEDURE — 3074F SYST BP LT 130 MM HG: CPT | Performed by: PSYCHIATRY & NEUROLOGY

## 2025-05-22 PROCEDURE — 99214 OFFICE O/P EST MOD 30 MIN: CPT | Performed by: PSYCHIATRY & NEUROLOGY

## 2025-05-22 PROCEDURE — 3079F DIAST BP 80-89 MM HG: CPT | Performed by: PSYCHIATRY & NEUROLOGY

## 2025-05-22 NOTE — PROGRESS NOTES
SUBJECTIVE:   Nicole Ennis is a 49 y.o. female seen for a follow up visit regarding the following:     Chief Complaint   Patient presents with    Follow-up     Restless leg syndrome        HPI:  She returns today for follow up. RLS sx were aggravated but better now with tramadol/lyrica/mirapex.  She tried cpap but wasn't able to tolerate it. She tried for a month. She tried to get off the mirapex but had intolerable side effects.          Past Medical History, Past Surgical History, Family history, Social History, and Medications were all reviewed with the patient today and updated as necessary.     Current Outpatient Medications   Medication Sig Dispense Refill    montelukast (SINGULAIR) 10 MG tablet TAKE 1 TABLET BY MOUTH EVERY DAY AT NIGHT 90 tablet 0    lisinopril (PRINIVIL;ZESTRIL) 5 MG tablet TAKE 1 TABLET BY MOUTH EVERY DAY 90 tablet 0    pregabalin (LYRICA) 200 MG capsule Take 1 capsule by mouth in the morning, at noon, and at bedtime for 180 days. Max Daily Amount: 600 mg 90 capsule 5    traMADol (ULTRAM) 50 MG tablet Take 1 tablet by mouth every 6 hours as needed for Pain for up to 60 days. Max Daily Amount: 200 mg 120 tablet 1    levothyroxine (SYNTHROID) 88 MCG tablet Take 1 tablet by mouth daily 30 tablet 2    venlafaxine (EFFEXOR) 50 MG tablet Take 1 tablet by mouth daily 30 tablet 2    methotrexate (RHEUMATREX) 2.5 MG chemo tablet Take 6 tablets by mouth once a week      folic acid (FOLVITE) 1 MG tablet Take 1 tablet by mouth daily      vitamin D3 (CHOLECALCIFEROL) 125 MCG (5000 UT) TABS tablet Take 1 tablet by mouth daily      pramipexole (MIRAPEX) 0.5 MG tablet Take 1 tablet by mouth nightly 90 tablet 3    meloxicam (MOBIC) 15 MG tablet Take 1 tablet by mouth daily      ustekinumab (STELARA) 90 MG/ML SOSY prefilled syringe Inject 1 mL into the skin      albuterol sulfate HFA (VENTOLIN HFA) 108 (90 Base) MCG/ACT inhaler Inhale 2 puffs into the lungs 4 times daily as needed for Wheezing 18 g 5

## 2025-07-01 RX ORDER — VENLAFAXINE 50 MG/1
50 TABLET ORAL DAILY
Qty: 30 TABLET | Refills: 2 | Status: SHIPPED | OUTPATIENT
Start: 2025-07-01

## 2025-07-01 NOTE — TELEPHONE ENCOUNTER
Last seen 4/23/2025   Last labs 1/15/2025  Last filled  3/26/2025  Next appointment 7/15/2025     Lab Results   Component Value Date     01/15/2025    K 4.1 01/15/2025     01/15/2025    CO2 30 01/15/2025    BUN 17 01/15/2025    CREATININE 0.8 01/15/2025    GLUCOSE 64 (L) 01/15/2025    CALCIUM 9.8 01/15/2025    BILITOT 0.3 10/23/2024    ALKPHOS 40 10/23/2024    AST 14 10/23/2024    ALT 9 10/23/2024    LABGLOM >60.0 01/15/2025    AGRATIO 1.4 10/23/2024    GLOB 2.9 10/23/2024

## 2025-07-10 ENCOUNTER — HOSPITAL ENCOUNTER (OUTPATIENT)
Facility: HOSPITAL | Age: 50
Setting detail: SPECIMEN
Discharge: HOME | End: 2025-07-13
Payer: COMMERCIAL

## 2025-07-10 DIAGNOSIS — G25.81 RLS (RESTLESS LEGS SYNDROME): ICD-10-CM

## 2025-07-10 DIAGNOSIS — E03.9 HYPOTHYROIDISM, UNSPECIFIED TYPE: ICD-10-CM

## 2025-07-10 LAB
ALBUMIN SERPL-MCNC: 4.1 G/DL (ref 3.4–5)
ALBUMIN/GLOB SERPL: 1.3 (ref 0.8–1.7)
ALP SERPL-CCNC: 49 U/L (ref 45–117)
ALT SERPL-CCNC: 16 U/L (ref 10–35)
ANION GAP SERPL CALC-SCNC: 12 MMOL/L (ref 3–18)
AST SERPL-CCNC: 21 U/L (ref 10–38)
BASOPHILS # BLD: 0.05 K/UL (ref 0–0.1)
BASOPHILS NFR BLD: 1 % (ref 0–2)
BILIRUB SERPL-MCNC: 0.6 MG/DL (ref 0.2–1)
BUN SERPL-MCNC: 17 MG/DL (ref 6–23)
BUN/CREAT SERPL: 17 (ref 12–20)
CALCIUM SERPL-MCNC: 10.3 MG/DL (ref 8.5–10.1)
CHLORIDE SERPL-SCNC: 99 MMOL/L (ref 98–107)
CO2 SERPL-SCNC: 27 MMOL/L (ref 21–32)
CREAT SERPL-MCNC: 0.99 MG/DL (ref 0.6–1.3)
DIFFERENTIAL METHOD BLD: ABNORMAL
EOSINOPHIL # BLD: 0.46 K/UL (ref 0–0.4)
EOSINOPHIL NFR BLD: 9.2 % (ref 0–5)
ERYTHROCYTE [DISTWIDTH] IN BLOOD BY AUTOMATED COUNT: 12.4 % (ref 11.6–14.5)
FERRITIN SERPL-MCNC: 113 NG/ML (ref 13–400)
GLOBULIN SER CALC-MCNC: 3.2 G/DL (ref 2–4)
GLUCOSE SERPL-MCNC: 74 MG/DL (ref 74–108)
HCT VFR BLD AUTO: 43.8 % (ref 35–45)
HGB BLD-MCNC: 14 G/DL (ref 12–16)
IMM GRANULOCYTES # BLD AUTO: 0 K/UL (ref 0–0.04)
IMM GRANULOCYTES NFR BLD AUTO: 0 % (ref 0–0.5)
IRON SATN MFR SERPL: 35 %
IRON SERPL-MCNC: 109 UG/DL (ref 50–175)
LYMPHOCYTES # BLD: 1.99 K/UL (ref 0.9–3.6)
LYMPHOCYTES NFR BLD: 39.7 % (ref 21–52)
MCH RBC QN AUTO: 31.7 PG (ref 24–34)
MCHC RBC AUTO-ENTMCNC: 32 G/DL (ref 31–37)
MCV RBC AUTO: 99.1 FL (ref 78–100)
MONOCYTES # BLD: 0.71 K/UL (ref 0.05–1.2)
MONOCYTES NFR BLD: 14.2 % (ref 3–10)
NEUTS SEG # BLD: 1.8 K/UL (ref 1.8–8)
NEUTS SEG NFR BLD: 35.9 % (ref 40–73)
NRBC # BLD: 0 K/UL (ref 0–0.01)
NRBC BLD-RTO: 0 PER 100 WBC
PLATELET # BLD AUTO: 180 K/UL (ref 135–420)
PMV BLD AUTO: 13.5 FL (ref 9.2–11.8)
POTASSIUM SERPL-SCNC: 4.8 MMOL/L (ref 3.5–5.5)
PROT SERPL-MCNC: 7.4 G/DL (ref 6.4–8.2)
RBC # BLD AUTO: 4.42 M/UL (ref 4.2–5.3)
SODIUM SERPL-SCNC: 138 MMOL/L (ref 136–145)
TIBC SERPL-MCNC: 312 UG/DL (ref 250–450)
UIBC SERPL-MCNC: 203 UG/DL (ref 112–347)
WBC # BLD AUTO: 5 K/UL (ref 4.6–13.2)

## 2025-07-10 PROCEDURE — 85025 COMPLETE CBC W/AUTO DIFF WBC: CPT

## 2025-07-10 PROCEDURE — 82728 ASSAY OF FERRITIN: CPT

## 2025-07-10 PROCEDURE — 83550 IRON BINDING TEST: CPT

## 2025-07-10 PROCEDURE — 36415 COLL VENOUS BLD VENIPUNCTURE: CPT

## 2025-07-10 PROCEDURE — 84443 ASSAY THYROID STIM HORMONE: CPT

## 2025-07-10 PROCEDURE — 80053 COMPREHEN METABOLIC PANEL: CPT

## 2025-07-10 PROCEDURE — 83540 ASSAY OF IRON: CPT

## 2025-07-10 RX ORDER — LEVOTHYROXINE SODIUM 88 UG/1
88 TABLET ORAL DAILY
Qty: 30 TABLET | Refills: 0 | Status: SHIPPED | OUTPATIENT
Start: 2025-07-10

## 2025-07-10 NOTE — TELEPHONE ENCOUNTER
Last seen 4/23/2025   Last labs 1/15/2025  Last filled  4/15/2025 requesting a 90 day supply  Next appointment 7/15/2025     Lab Results   Component Value Date     01/15/2025    K 4.1 01/15/2025     01/15/2025    CO2 30 01/15/2025    BUN 17 01/15/2025    CREATININE 0.8 01/15/2025    GLUCOSE 64 (L) 01/15/2025    CALCIUM 9.8 01/15/2025    BILITOT 0.3 10/23/2024    ALKPHOS 40 10/23/2024    AST 14 10/23/2024    ALT 9 10/23/2024    LABGLOM >60.0 01/15/2025    AGRATIO 1.4 10/23/2024    GLOB 2.9 10/23/2024

## 2025-07-11 LAB — TSH SERPL DL<=0.05 MIU/L-ACNC: 0.54 UIU/ML (ref 0.45–4.5)

## 2025-07-15 ENCOUNTER — OFFICE VISIT (OUTPATIENT)
Facility: CLINIC | Age: 50
End: 2025-07-15
Payer: COMMERCIAL

## 2025-07-15 VITALS
DIASTOLIC BLOOD PRESSURE: 82 MMHG | RESPIRATION RATE: 15 BRPM | HEIGHT: 67 IN | HEART RATE: 79 BPM | WEIGHT: 193 LBS | OXYGEN SATURATION: 97 % | TEMPERATURE: 98.2 F | BODY MASS INDEX: 30.29 KG/M2 | SYSTOLIC BLOOD PRESSURE: 121 MMHG

## 2025-07-15 DIAGNOSIS — D70.9 NEUTROPENIA, UNSPECIFIED TYPE: ICD-10-CM

## 2025-07-15 DIAGNOSIS — N95.1 MENOPAUSAL SYNDROME (HOT FLASHES): ICD-10-CM

## 2025-07-15 DIAGNOSIS — E03.9 HYPOTHYROIDISM, UNSPECIFIED TYPE: Primary | ICD-10-CM

## 2025-07-15 PROCEDURE — 3079F DIAST BP 80-89 MM HG: CPT

## 2025-07-15 PROCEDURE — 3074F SYST BP LT 130 MM HG: CPT

## 2025-07-15 PROCEDURE — 99214 OFFICE O/P EST MOD 30 MIN: CPT

## 2025-07-15 RX ORDER — VENLAFAXINE 75 MG/1
75 TABLET ORAL DAILY
Qty: 90 TABLET | Refills: 1 | Status: SHIPPED | OUTPATIENT
Start: 2025-07-15

## 2025-07-15 RX ORDER — LEVOTHYROXINE SODIUM 88 UG/1
88 TABLET ORAL DAILY
Qty: 90 TABLET | Refills: 1 | Status: SHIPPED | OUTPATIENT
Start: 2025-07-15

## 2025-07-15 SDOH — ECONOMIC STABILITY: FOOD INSECURITY: WITHIN THE PAST 12 MONTHS, THE FOOD YOU BOUGHT JUST DIDN'T LAST AND YOU DIDN'T HAVE MONEY TO GET MORE.: NEVER TRUE

## 2025-07-15 SDOH — ECONOMIC STABILITY: FOOD INSECURITY: WITHIN THE PAST 12 MONTHS, YOU WORRIED THAT YOUR FOOD WOULD RUN OUT BEFORE YOU GOT MONEY TO BUY MORE.: NEVER TRUE

## 2025-07-15 ASSESSMENT — PATIENT HEALTH QUESTIONNAIRE - PHQ9
1. LITTLE INTEREST OR PLEASURE IN DOING THINGS: NOT AT ALL
SUM OF ALL RESPONSES TO PHQ QUESTIONS 1-9: 0
7. TROUBLE CONCENTRATING ON THINGS, SUCH AS READING THE NEWSPAPER OR WATCHING TELEVISION: NOT AT ALL
10. IF YOU CHECKED OFF ANY PROBLEMS, HOW DIFFICULT HAVE THESE PROBLEMS MADE IT FOR YOU TO DO YOUR WORK, TAKE CARE OF THINGS AT HOME, OR GET ALONG WITH OTHER PEOPLE: NOT DIFFICULT AT ALL
6. FEELING BAD ABOUT YOURSELF - OR THAT YOU ARE A FAILURE OR HAVE LET YOURSELF OR YOUR FAMILY DOWN: NOT AT ALL
SUM OF ALL RESPONSES TO PHQ QUESTIONS 1-9: 0
4. FEELING TIRED OR HAVING LITTLE ENERGY: NOT AT ALL
3. TROUBLE FALLING OR STAYING ASLEEP: NOT AT ALL
SUM OF ALL RESPONSES TO PHQ QUESTIONS 1-9: 0
9. THOUGHTS THAT YOU WOULD BE BETTER OFF DEAD, OR OF HURTING YOURSELF: NOT AT ALL
8. MOVING OR SPEAKING SO SLOWLY THAT OTHER PEOPLE COULD HAVE NOTICED. OR THE OPPOSITE, BEING SO FIGETY OR RESTLESS THAT YOU HAVE BEEN MOVING AROUND A LOT MORE THAN USUAL: NOT AT ALL
2. FEELING DOWN, DEPRESSED OR HOPELESS: NOT AT ALL
5. POOR APPETITE OR OVEREATING: NOT AT ALL
SUM OF ALL RESPONSES TO PHQ QUESTIONS 1-9: 0

## 2025-07-15 ASSESSMENT — ENCOUNTER SYMPTOMS: SHORTNESS OF BREATH: 0

## 2025-07-15 NOTE — ASSESSMENT & PLAN NOTE
Uncontrolled; asymptomatic  Plan to monitor labs  Repeat CBC ordered today   Advised to report any new symptoms as discussed during office visit  Pt verbalized understanding and agreeable to plan

## 2025-07-15 NOTE — PROGRESS NOTES
Nicole Ennis is a 49 y.o. female is seen on 7/15/2025 for Follow-up, Discuss Labs, and Abdominal Pain    Assessment & Plan:   1. Hypothyroidism, unspecified type  Assessment & Plan:  Chronic; stable   Continue levothyroxine 88 mcg daily  Orders:  -     levothyroxine (SYNTHROID) 88 MCG tablet; Take 1 tablet by mouth daily, Disp-90 tablet, R-1Normal  2. Menopausal syndrome (hot flashes)  Assessment & Plan:  Uncontrolled   Increase venlafaxine 75 mg daily   Advised to RTC if symptoms do not improve sooner than  6 mos follow-up  Orders:  -     venlafaxine (EFFEXOR) 75 MG tablet; Take 1 tablet by mouth daily, Disp-90 tablet, R-1Normal  3. Neutropenia, unspecified type  Assessment & Plan:  Uncontrolled; asymptomatic  Plan to monitor labs  Repeat CBC ordered today   Advised to report any new symptoms as discussed during office visit  Pt verbalized understanding and agreeable to plan    Follow-up and Dispositions    Return in about 6 months (around 1/15/2026).       Subjective:     History of Present Illness  The patient presents for evaluation of umbilical hernia and hot flashes.    She was previously diagnosed with an umbilical hernia, which initially caused no discomfort. However, she has recently begun to experience issues with it. She reports a sensation of the hernia protruding when she squats or bends over, and it retracts upon standing. This is a new development as she did not feel the hernia at the time of diagnosis. Despite these sensations, she does not experience any pain. An ultrasound was performed while she was standing and bearing down to visualize the hernia. She has an upcoming appointment with a vascular specialist on 10/01/2025.    She is experiencing severe hot flashes, particularly at night, which are causing nausea. She is considering returning to the extended-release form of venlafaxine or exploring other treatment options. She believes these symptoms may be hormone-related. She notes that the 
true     Financial Resource Strain: Low Risk  (1/7/2025)    Overall Financial Resource Strain (CARDIA)     Difficulty of Paying Living Expenses: Not hard at all     Housing Stability: High Risk (7/15/2025)    Housing Stability Vital Sign     Unable to Pay for Housing in the Last Year: No     Number of Times Moved in the Last Year: 2     Homeless in the Last Year: No       Did you provide resources if patient requested them? None Requested      Health Maintenance Due   Topic Date Due    HIV screen  Never done    Hepatitis C screen  Never done    Hepatitis B vaccine (1 of 3 - 19+ 3-dose series) Never done    DTaP/Tdap/Td vaccine (1 - Tdap) Never done    Shingles vaccine (1 of 2) Never done    COVID-19 Vaccine (1 - 2024-25 season) Never done   .      \"Have you been to the ER, urgent care clinic since your last visit?  Hospitalized since your last visit?\"    NO    “Have you seen or consulted any other health care providers outside our system since your last visit?”    NO

## 2025-08-11 DIAGNOSIS — I10 PRIMARY HYPERTENSION: ICD-10-CM

## 2025-08-11 DIAGNOSIS — J45.909 MILD ASTHMA WITHOUT COMPLICATION, UNSPECIFIED WHETHER PERSISTENT: ICD-10-CM

## 2025-08-13 RX ORDER — MONTELUKAST SODIUM 10 MG/1
10 TABLET ORAL NIGHTLY
Qty: 90 TABLET | Refills: 1 | Status: SHIPPED | OUTPATIENT
Start: 2025-08-13

## 2025-08-13 RX ORDER — LISINOPRIL 5 MG/1
5 TABLET ORAL DAILY
Qty: 90 TABLET | Refills: 1 | Status: SHIPPED | OUTPATIENT
Start: 2025-08-13